# Patient Record
Sex: MALE | Race: WHITE | Employment: OTHER | URBAN - METROPOLITAN AREA
[De-identification: names, ages, dates, MRNs, and addresses within clinical notes are randomized per-mention and may not be internally consistent; named-entity substitution may affect disease eponyms.]

---

## 2022-08-13 ENCOUNTER — ANESTHESIA (OUTPATIENT)
Dept: OPERATING ROOM | Age: 67
End: 2022-08-13
Payer: COMMERCIAL

## 2022-08-13 ENCOUNTER — ANESTHESIA EVENT (OUTPATIENT)
Dept: OPERATING ROOM | Age: 67
End: 2022-08-13
Payer: COMMERCIAL

## 2022-08-13 ENCOUNTER — HOSPITAL ENCOUNTER (OUTPATIENT)
Age: 67
Setting detail: OUTPATIENT SURGERY
Discharge: HOME | End: 2022-08-13
Attending: SPECIALIST | Admitting: SPECIALIST
Payer: COMMERCIAL

## 2022-08-13 VITALS
HEART RATE: 54 BPM | BODY MASS INDEX: 40.09 KG/M2 | TEMPERATURE: 97.9 F | DIASTOLIC BLOOD PRESSURE: 67 MMHG | HEIGHT: 70 IN | RESPIRATION RATE: 22 BRPM | OXYGEN SATURATION: 94 % | SYSTOLIC BLOOD PRESSURE: 122 MMHG | WEIGHT: 280 LBS

## 2022-08-13 DIAGNOSIS — Z12.11 COLON CANCER SCREENING: ICD-10-CM

## 2022-08-13 LAB
GLUCOSE BLD-MCNC: 103 MG/DL (ref 70–99)
PERFORMED ON: ABNORMAL

## 2022-08-13 PROCEDURE — 3609027000 HC COLONOSCOPY: Performed by: SPECIALIST

## 2022-08-13 PROCEDURE — 3700000000 HC ANESTHESIA ATTENDED CARE: Performed by: SPECIALIST

## 2022-08-13 PROCEDURE — 2580000003 HC RX 258: Performed by: SPECIALIST

## 2022-08-13 PROCEDURE — 7100000011 HC PHASE II RECOVERY - ADDTL 15 MIN: Performed by: SPECIALIST

## 2022-08-13 PROCEDURE — 7100000010 HC PHASE II RECOVERY - FIRST 15 MIN: Performed by: SPECIALIST

## 2022-08-13 PROCEDURE — 45380 COLONOSCOPY AND BIOPSY: CPT | Performed by: SPECIALIST

## 2022-08-13 PROCEDURE — 3700000001 HC ADD 15 MINUTES (ANESTHESIA): Performed by: SPECIALIST

## 2022-08-13 PROCEDURE — 6360000002 HC RX W HCPCS: Performed by: NURSE ANESTHETIST, CERTIFIED REGISTERED

## 2022-08-13 PROCEDURE — 2500000003 HC RX 250 WO HCPCS: Performed by: NURSE ANESTHETIST, CERTIFIED REGISTERED

## 2022-08-13 PROCEDURE — 2709999900 HC NON-CHARGEABLE SUPPLY: Performed by: SPECIALIST

## 2022-08-13 PROCEDURE — 88305 TISSUE EXAM BY PATHOLOGIST: CPT

## 2022-08-13 RX ORDER — GLIMEPIRIDE 1 MG/1
1 TABLET ORAL
COMMUNITY

## 2022-08-13 RX ORDER — LIDOCAINE HYDROCHLORIDE 20 MG/ML
INJECTION, SOLUTION INFILTRATION; PERINEURAL PRN
Status: DISCONTINUED | OUTPATIENT
Start: 2022-08-13 | End: 2022-08-13 | Stop reason: SDUPTHER

## 2022-08-13 RX ORDER — AMLODIPINE BESYLATE 5 MG/1
5 TABLET ORAL DAILY
COMMUNITY

## 2022-08-13 RX ORDER — MAGNESIUM HYDROXIDE 1200 MG/15ML
LIQUID ORAL PRN
Status: DISCONTINUED | OUTPATIENT
Start: 2022-08-13 | End: 2022-08-13 | Stop reason: ALTCHOICE

## 2022-08-13 RX ORDER — SODIUM CHLORIDE, SODIUM LACTATE, POTASSIUM CHLORIDE, CALCIUM CHLORIDE 600; 310; 30; 20 MG/100ML; MG/100ML; MG/100ML; MG/100ML
500 INJECTION, SOLUTION INTRAVENOUS CONTINUOUS
Status: DISCONTINUED | OUTPATIENT
Start: 2022-08-13 | End: 2022-08-13 | Stop reason: HOSPADM

## 2022-08-13 RX ORDER — FINASTERIDE 5 MG/1
5 TABLET, FILM COATED ORAL DAILY
COMMUNITY

## 2022-08-13 RX ORDER — PROPOFOL 10 MG/ML
INJECTION, EMULSION INTRAVENOUS PRN
Status: DISCONTINUED | OUTPATIENT
Start: 2022-08-13 | End: 2022-08-13 | Stop reason: SDUPTHER

## 2022-08-13 RX ORDER — ROSUVASTATIN CALCIUM 5 MG/1
5 TABLET, COATED ORAL DAILY
COMMUNITY

## 2022-08-13 RX ADMIN — PROPOFOL 300 MG: 10 INJECTION, EMULSION INTRAVENOUS at 08:41

## 2022-08-13 RX ADMIN — SODIUM CHLORIDE, POTASSIUM CHLORIDE, SODIUM LACTATE AND CALCIUM CHLORIDE 500 ML: 600; 310; 30; 20 INJECTION, SOLUTION INTRAVENOUS at 07:58

## 2022-08-13 RX ADMIN — LIDOCAINE HYDROCHLORIDE 20 MG: 20 INJECTION, SOLUTION INFILTRATION; PERINEURAL at 08:41

## 2022-08-13 ASSESSMENT — PAIN SCALES - GENERAL
PAINLEVEL_OUTOF10: 0

## 2022-08-13 ASSESSMENT — PAIN - FUNCTIONAL ASSESSMENT: PAIN_FUNCTIONAL_ASSESSMENT: 0-10

## 2022-08-13 NOTE — PROGRESS NOTES
Patient ID:  Marko Palacios  [id number]  75 y.o.  1955    9:07 AM Patient received in  PACU 2 Report received from Anesthesia and Surgical Nurse. Attached to Monitor, VSS, See Nursing Assessment and Flowsheets for detailed Information    9:28 AM Awake, following commands, answering questions appropriately. O2 Sats>92 on Room air. Taking po fluids well, VSS.V Discontinued per protocol, catheter intact, patient tolerated well. Passing flatus, denies pain. 9:36 AM Discharge Instructions given, patient verbalizes an understanding. VSS.  Discharged to home per w/c in apparent Stable Condition    Electronically signed by Michelle rGove RN on 8/13/22

## 2022-08-13 NOTE — ANESTHESIA PRE PROCEDURE
Department of Anesthesiology  Preprocedure Note       Name:  Amena Sams   Age:  75 y.o.  :  1955                                          MRN:  [id number]         Date:  2022      Surgeon: Dionne Draper):  Valarie Lugo MD    Procedure: Procedure(s):  COLORECTAL CANCER SCREENING, NOT HIGH RISK    Medications prior to admission:   Prior to Admission medications    Medication Sig Start Date End Date Taking? Authorizing Provider   amLODIPine (NORVASC) 5 MG tablet Take 5 mg by mouth in the morning. Yes Historical Provider, MD   finasteride (PROSCAR) 5 MG tablet Take 5 mg by mouth in the morning. Yes Historical Provider, MD   glimepiride (AMARYL) 1 MG tablet Take 1 mg by mouth every morning (before breakfast)   Yes Historical Provider, MD   rosuvastatin (CRESTOR) 5 MG tablet Take 5 mg by mouth in the morning. Yes Historical Provider, MD   traMADol (ULTRAM) 50 MG tablet Take 1 tablet by mouth 2 times daily as needed for Pain for up to 30 days. Fill 20. 20  ESPERANZA Pham CNP   lidocaine (XYLOCAINE) 5 % ointment Apply 1 gram topically QID as needed to affected area. Patient not taking: Reported on 2022   ESPERANZA Pham CNP   gabapentin (NEURONTIN) 300 MG capsule Take 1 capsule by mouth 2 times daily for 30 days. Intended supply: 30 days 3/12/20 4/11/20  Maria Antonia Ayala,    gabapentin (NEURONTIN) 600 MG tablet TAKE 1 TABLET BY MOUTH THREE TIMES DAILY 16   Maria Antonia Ayala, DO   gabapentin (NEURONTIN) 600 MG tablet Take 1 tablet by mouth 3 times daily 16  Maria Antonia Ayala, DO   naproxen (NAPROSYN) 500 MG tablet TAKE 1 TABLET BY MOUTH 2 TIMES DAILY (WITH MEALS) 16   Maria Antonia Ayala, DO   gabapentin (NEURONTIN) 300 MG capsule Take 1 capsule by mouth 3 times daily 9/14/15   Maria Antonia Ayala, DO   methylPREDNISolone (MEDROL, THALIA,) 4 MG tablet Take by mouth. As directed, 5 days.   Patient not taking: Reported on 2022 8/31/15   Maria Antonia Ayala, DO oxyCODONE-acetaminophen (PERCOCET)  MG per tablet Take 1 tablet by mouth 4 times daily  Patient not taking: Reported on 8/13/2022    Historical Provider, MD   tamsulosin (FLOMAX) 0.4 MG capsule Take 0.4 mg by mouth daily. Historical Provider, MD   lisinopril (PRINIVIL;ZESTRIL) 40 MG tablet Take 40 mg by mouth daily. Historical Provider, MD   busPIRone (BUSPAR) 10 MG tablet Take 10 mg by mouth daily. Patient not taking: Reported on 8/13/2022    Historical Provider, MD   buPROPion 150 MG SR tablet Take 150 mg by mouth Daily. Patient not taking: Reported on 8/13/2022    Historical Provider, MD   ARIPiprazole (ABILIFY) 2 MG tablet Take 2 mg by mouth daily. Patient not taking: Reported on 8/13/2022    Historical Provider, MD   Fexofenadine-Pseudoephedrine (ALLEGRA-D 24 HOUR PO) Take  by mouth Daily. Historical Provider, MD   Multiple Vitamin (MULTIVITAMIN) tablet Take 1 tablet by mouth daily. Historical Provider, MD   fish oil-omega-3 fatty acids 1000 MG capsule Take 1 g by mouth 2 times daily. Historical Provider, MD   albuterol (PROVENTIL HFA;VENTOLIN HFA) 108 (90 BASE) MCG/ACT inhaler Inhale 2 puffs into the lungs 4 times daily. Historical Provider, MD   Cholecalciferol (VITAMIN D3) 1000 UNITS TABS Take  by mouth Daily. Historical Provider, MD   zonisamide (ZONEGRAN) 50 MG capsule Take 50 mg by mouth daily. Patient not taking: Reported on 8/13/2022    Historical Provider, MD       Current medications:    Current Facility-Administered Medications   Medication Dose Route Frequency Provider Last Rate Last Admin    lactated ringers infusion 500 mL  500 mL IntraVENous Continuous Magdy Gtz  mL/hr at 08/13/22 0758 500 mL at 08/13/22 0758       Allergies:     Allergies   Allergen Reactions    Finasteride        Problem List:    Patient Active Problem List   Diagnosis Code    HTN (hypertension) I10    Hyperlipidemia E78.5    Vitamin D deficiency E55.9    [facility]) [facility] J44.9    Depression F32. A    KASI (obstructive sleep apnea) G47.33    History of TIAs Z86.73    Arthritis M19.90    Degeneration of lumbar or lumbosacral intervertebral disc M51.37    Lumbar stenosis with neurogenic claudication M48.062       Past Medical History:        Diagnosis Date    Arthritis     Bronchitis     Chickenpox     COPD (chronic obstructive pulmonary disease) (HCC)     per pt not a medical history    Depression     History of TIAs     HTN (hypertension)     Hyperlipidemia     Measles     Mumps     KASI (obstructive sleep apnea)     Osteoarthritis     Vitamin D deficiency        Past Surgical History:        Procedure Laterality Date    JOINT REPLACEMENT Right 10/01/2015    knee    TOTAL KNEE ARTHROPLASTY Left     PARTIAL       Social History:    Social History     Tobacco Use    Smoking status: Never    Smokeless tobacco: Current   Substance Use Topics    Alcohol use: Yes                                Ready to quit: Not Answered  Counseling given: Not Answered      Vital Signs (Current):   Vitals:    08/13/22 0743   BP: (!) 153/79   Pulse: 63   Resp: 18   Temp: 36.7 °C (98 °F)   TempSrc: Temporal   SpO2: 98%   Weight: 280 lb (127 kg)   Height: 5' 9.5\" (1.765 m)                                              BP Readings from Last 3 Encounters:   08/13/22 (!) 153/79   05/03/16 (!) 153/97   04/01/16 139/79       NPO Status: Time of last liquid consumption: 2200                        Time of last solid consumption: 2300                        Date of last liquid consumption: 08/12/22                        Date of last solid food consumption: 08/11/22    BMI:   Wt Readings from Last 3 Encounters:   08/13/22 280 lb (127 kg)   03/12/20 275 lb (124.7 kg)   06/07/16 275 lb (124.7 kg)     Body mass index is 40.76 kg/m².     CBC: No results found for: WBC, RBC, HGB, HCT, MCV, RDW, PLT    CMP: No results found for: NA, K, CL, CO2, BUN, CREATININE, GFRAA, AGRATIO, LABGLOM, GLUCOSE, GLU, PROT, CALCIUM, BILITOT, ALKPHOS, AST, ALT    POC Tests:   Recent Labs     08/13/22  0748   POCGLU 103*       Coags: No results found for: PROTIME, INR, APTT    HCG (If Applicable): No results found for: PREGTESTUR, PREGSERUM, HCG, HCGQUANT     ABGs: No results found for: PHART, PO2ART, RHV4OIH, RME7BQD, BEART, D7XSAATA     Type & Screen (If Applicable):  No results found for: LABABO, LABRH    Drug/Infectious Status (If Applicable):  No results found for: HIV, HEPCAB    COVID-19 Screening (If Applicable): No results found for: COVID19        Anesthesia Evaluation  Patient summary reviewed and Nursing notes reviewed  Airway: Mallampati: III  TM distance: >3 FB   Neck ROM: full  Mouth opening: > = 3 FB   Dental:    (+) other      Pulmonary: breath sounds clear to auscultation  (+) COPD:  sleep apnea:                             Cardiovascular:  Exercise tolerance: no interval change,   (+) hypertension:, hyperlipidemia      NYHA Classification: III    Rhythm: regular  Rate: normal           Beta Blocker:  Not on Beta Blocker         Neuro/Psych:   (+) TIA,             GI/Hepatic/Renal:   (+) bowel prep, morbid obesity          Endo/Other: Negative Endo/Other ROS                    Abdominal:   (+) obese,     Abdomen: soft. Vascular: negative vascular ROS. Other Findings:           Anesthesia Plan      MAC     ASA 3       Induction: intravenous. continuous noninvasive hemodynamic monitor    Anesthetic plan and risks discussed with patient. Plan discussed with attending.                     ESPERANZA Tineo - SAMEERA   8/13/2022

## 2022-08-13 NOTE — H&P
DAILY (WITH MEALS) 4/1/16   Rocky Isaacs DO   gabapentin (NEURONTIN) 300 MG capsule Take 1 capsule by mouth 3 times daily 9/14/15   Rocky Isaacs DO   methylPREDNISolone (MEDROL, THALIA,) 4 MG tablet Take by mouth. As directed, 5 days. 8/31/15   Rocky Isaacs DO   oxyCODONE-acetaminophen (PERCOCET)  MG per tablet Take 1 tablet by mouth 4 times daily    Historical Provider, MD   tamsulosin (FLOMAX) 0.4 MG capsule Take 0.4 mg by mouth daily. Historical Provider, MD   lisinopril (PRINIVIL;ZESTRIL) 40 MG tablet Take 40 mg by mouth daily. Historical Provider, MD   busPIRone (BUSPAR) 10 MG tablet Take 10 mg by mouth daily. Historical Provider, MD   buPROPion 150 MG SR tablet Take 150 mg by mouth Daily. Historical Provider, MD   ARIPiprazole (ABILIFY) 2 MG tablet Take 2 mg by mouth daily. Historical Provider, MD   Fexofenadine-Pseudoephedrine (ALLEGRA-D 24 HOUR PO) Take  by mouth Daily. Historical Provider, MD   Multiple Vitamin (MULTIVITAMIN) tablet Take 1 tablet by mouth daily. Historical Provider, MD   fish oil-omega-3 fatty acids 1000 MG capsule Take 1 g by mouth 2 times daily. Historical Provider, MD   albuterol (PROVENTIL HFA;VENTOLIN HFA) 108 (90 BASE) MCG/ACT inhaler Inhale 2 puffs into the lungs 4 times daily. Historical Provider, MD   Cholecalciferol (VITAMIN D3) 1000 UNITS TABS Take  by mouth Daily. Historical Provider, MD   zonisamide (ZONEGRAN) 50 MG capsule Take 50 mg by mouth daily. Historical Provider, MD       Allergies:    Allergies   Allergen Reactions    Finasteride         History of allergic reaction to anesthesia:  No    Past Medical History:  Past Medical History:   Diagnosis Date    Arthritis     Bronchitis     Chickenpox     COPD (chronic obstructive pulmonary disease) (HCC)     Depression     History of TIAs     HTN (hypertension)     Hyperlipidemia     Measles     Mumps     KASI (obstructive sleep apnea)     Osteoarthritis     Vitamin D deficiency        Past Surgical History:  Past Surgical History:   Procedure Laterality Date    JOINT REPLACEMENT Right 10/2015    knee    TOTAL KNEE ARTHROPLASTY Right     PARTIAL       Social History:  Social History     Tobacco Use    Smoking status: Never    Smokeless tobacco: Current   Substance Use Topics    Alcohol use: Yes    Drug use: No       Vital Signs: There were no vitals filed for this visit. Physical Exam:  Cardiac:  [x]WNL  []Comments:  Pulmonary:  [x]WNL   []Comments:   Neuro/Mental Status:  [x]WNL  []Comments:  Abdominal:  [x]WNL    []Comments:  Other:   []WNL  []Comments:    Informed Consent:  The risks and benefits of the procedure have been discussed with either the patient or if they cannot consent, their representative. Assessment:  Patient examined and appropriate for planned sedation and procedure. Plan:  Proceed with planned sedation and procedure as above.     Star Humphrey MD  7:42 AM

## 2022-08-13 NOTE — ANESTHESIA POSTPROCEDURE EVALUATION
Department of Anesthesiology  Postprocedure Note    Patient: William Meyer  MRN: [id number]  YOB: 1955  Date of evaluation: 8/13/2022      Procedure Summary     Date: 08/13/22 Room / Location: [address] [place] [address]    Anesthesia Start: 2939 Anesthesia Stop:     Procedure: COLORECTAL CANCER SCREENING, NOT HIGH RISK Diagnosis:       Colon cancer screening      (Screening)    Surgeons: Stalin Rivera MD Responsible Provider: ESPERANZA Grullon CRNA    Anesthesia Type: MAC ASA Status: 3          Anesthesia Type: No value filed.     Macy Phase I: Macy Score: 10    Macy Phase II:        Anesthesia Post Evaluation    Patient location during evaluation: PACU  Patient participation: waiting for patient participation  Level of consciousness: sleepy but conscious  Pain score: 1  Airway patency: patent  Nausea & Vomiting: no nausea and no vomiting  Complications: no  Cardiovascular status: hemodynamically stable  Respiratory status: acceptable and nasal cannula  Hydration status: euvolemic  Comments: Report to RN, normal sinus rhythm

## 2022-08-13 NOTE — DISCHARGE INSTRUCTIONS
Lower Discharge Instructions    Patient Name: Edilma Petit  Patient ID: [id number]  YOB: 1955  Procedure: Hank Chisholm  Referring Physician: [unfilled]  Procedure Date: 8/13/2022    Recommendations:  []   Follow-up appointment with family physician in 4 weeks. [x]   Colonoscopy recommended in 5 years. []   Follow-up appointment with endoscopist in  Reports of your procedure and these recommendations have been sent to:  [unfilled]    Sedative medication given for procedures can slow your reaction time and coordination for many hours. If you receive medications, it is important for your safety to follow the instructions below for the remainder of the day:  BE TAKEN directly home from the center and rest quietly. DO NOT resume normal activities until tomorrow. Do NOT drive, return to work, or operate any machinery or power tools. Do NOT make any important personal or business decisions, sign any legal papers or perform any activity that depends on your full concentration power or mental judgement. Do NOT drink any alcohol or take nerve or sleeping drugs. They add to the effects of the medicine still present in your body.    [] (Checked if a biopsy or polyp removal was performed)  There is a slight risk of bleeding. If you had a biopsy or a polyp removed, we suggest that you follow the instructions below:  Do NOT take aspirin or similar anti-inflammatory medicine for ___ days. Do NOT exercise, jog, or do any heavy lifting or straining for 1 day. Potential common after effects and treatment following the procedure:  Mild abdominal pain, bloating, or excessive gas - rest, eat lightly, and use a heating pad. Redness and/or swelling where the IV was - apply heat and elevate. Symptoms to report to your physician:  Severe abdominal pain or bloating. Chills or fever above 101 degrees occurring within 24 hours after procedure. Large amounts of rectal bleeding that does not stop.  Small amount of rectal bleeding is not serious especially if hemorrhoids are present. Unable to keep down food or drink. IV site stays red and swollen for more than 2 days. In the case of an emergency, please go to the emergency room. If you are not having an emergency but are having some of the above symptoms please call the doctor's office at:  Dr. Nadya Conde [phone]   [email or web address]      I have read and understand the above instructions:            ____________________________         ____________________________  Patient or Patient Rep. Signature   Witness Signature      Date: 8/13/2022  Time:

## 2022-09-12 PROBLEM — Z12.11 COLON CANCER SCREENING: Status: RESOLVED | Noted: 2022-08-13 | Resolved: 2022-09-12

## 2023-05-05 LAB
ACTIVATED PARTIAL THROMBOPLASTIN TIME IN PPP BY COAGULATION ASSAY: 30 SEC (ref 26–39)
ALANINE AMINOTRANSFERASE (SGPT) (U/L) IN SER/PLAS: 37 U/L (ref 10–52)
ALBUMIN (G/DL) IN SER/PLAS: 4.5 G/DL (ref 3.4–5)
ALKALINE PHOSPHATASE (U/L) IN SER/PLAS: 49 U/L (ref 33–136)
ANION GAP IN SER/PLAS: 13 MMOL/L (ref 10–20)
ASPARTATE AMINOTRANSFERASE (SGOT) (U/L) IN SER/PLAS: 28 U/L (ref 9–39)
BASOPHILS (10*3/UL) IN BLOOD BY AUTOMATED COUNT: 0.03 X10E9/L (ref 0–0.1)
BASOPHILS/100 LEUKOCYTES IN BLOOD BY AUTOMATED COUNT: 0.5 % (ref 0–2)
BILIRUBIN TOTAL (MG/DL) IN SER/PLAS: 0.6 MG/DL (ref 0–1.2)
CALCIUM (MG/DL) IN SER/PLAS: 9.6 MG/DL (ref 8.6–10.3)
CARBON DIOXIDE, TOTAL (MMOL/L) IN SER/PLAS: 28 MMOL/L (ref 21–32)
CHLORIDE (MMOL/L) IN SER/PLAS: 101 MMOL/L (ref 98–107)
CHOLESTEROL (MG/DL) IN SER/PLAS: 147 MG/DL (ref 0–199)
CHOLESTEROL IN HDL (MG/DL) IN SER/PLAS: 34 MG/DL
CHOLESTEROL/HDL RATIO: 4.3
CREATININE (MG/DL) IN SER/PLAS: 0.86 MG/DL (ref 0.5–1.3)
EOSINOPHILS (10*3/UL) IN BLOOD BY AUTOMATED COUNT: 0.29 X10E9/L (ref 0–0.7)
EOSINOPHILS/100 LEUKOCYTES IN BLOOD BY AUTOMATED COUNT: 4.6 % (ref 0–6)
ERYTHROCYTE DISTRIBUTION WIDTH (RATIO) BY AUTOMATED COUNT: 14 % (ref 11.5–14.5)
ERYTHROCYTE MEAN CORPUSCULAR HEMOGLOBIN CONCENTRATION (G/DL) BY AUTOMATED: 33.3 G/DL (ref 32–36)
ERYTHROCYTE MEAN CORPUSCULAR VOLUME (FL) BY AUTOMATED COUNT: 93 FL (ref 80–100)
ERYTHROCYTES (10*6/UL) IN BLOOD BY AUTOMATED COUNT: 5.02 X10E12/L (ref 4.5–5.9)
GFR MALE: >90 ML/MIN/1.73M2
GLUCOSE (MG/DL) IN SER/PLAS: 116 MG/DL (ref 74–99)
HEMATOCRIT (%) IN BLOOD BY AUTOMATED COUNT: 46.5 % (ref 41–52)
HEMOGLOBIN (G/DL) IN BLOOD: 15.5 G/DL (ref 13.5–17.5)
IMMATURE GRANULOCYTES/100 LEUKOCYTES IN BLOOD BY AUTOMATED COUNT: 0.2 % (ref 0–0.9)
INR IN PPP BY COAGULATION ASSAY: 1 (ref 0.9–1.1)
LDL: 76 MG/DL (ref 0–99)
LEUKOCYTES (10*3/UL) IN BLOOD BY AUTOMATED COUNT: 6.4 X10E9/L (ref 4.4–11.3)
LYMPHOCYTES (10*3/UL) IN BLOOD BY AUTOMATED COUNT: 2.73 X10E9/L (ref 1.2–4.8)
LYMPHOCYTES/100 LEUKOCYTES IN BLOOD BY AUTOMATED COUNT: 42.9 % (ref 13–44)
MONOCYTES (10*3/UL) IN BLOOD BY AUTOMATED COUNT: 0.79 X10E9/L (ref 0.1–1)
MONOCYTES/100 LEUKOCYTES IN BLOOD BY AUTOMATED COUNT: 12.4 % (ref 2–10)
NEUTROPHILS (10*3/UL) IN BLOOD BY AUTOMATED COUNT: 2.52 X10E9/L (ref 1.2–7.7)
NEUTROPHILS/100 LEUKOCYTES IN BLOOD BY AUTOMATED COUNT: 39.4 % (ref 40–80)
PLATELETS (10*3/UL) IN BLOOD AUTOMATED COUNT: 237 X10E9/L (ref 150–450)
POTASSIUM (MMOL/L) IN SER/PLAS: 3.9 MMOL/L (ref 3.5–5.3)
PROTEIN TOTAL: 7.6 G/DL (ref 6.4–8.2)
PROTHROMBIN TIME (PT) IN PPP BY COAGULATION ASSAY: 11.2 SEC (ref 9.8–13.4)
SODIUM (MMOL/L) IN SER/PLAS: 138 MMOL/L (ref 136–145)
THYROTROPIN (MIU/L) IN SER/PLAS BY DETECTION LIMIT <= 0.05 MIU/L: 4.1 MIU/L (ref 0.44–3.98)
TRIGLYCERIDE (MG/DL) IN SER/PLAS: 184 MG/DL (ref 0–149)
UREA NITROGEN (MG/DL) IN SER/PLAS: 25 MG/DL (ref 6–23)
VLDL: 37 MG/DL (ref 0–40)

## 2023-05-09 ENCOUNTER — HOSPITAL ENCOUNTER (OUTPATIENT)
Dept: DATA CONVERSION | Facility: HOSPITAL | Age: 68
End: 2023-05-09
Attending: PLASTIC SURGERY | Admitting: PLASTIC SURGERY
Payer: COMMERCIAL

## 2023-05-09 DIAGNOSIS — L72.0 EPIDERMAL CYST: ICD-10-CM

## 2023-05-09 DIAGNOSIS — F32.A DEPRESSION, UNSPECIFIED: ICD-10-CM

## 2023-05-09 DIAGNOSIS — E66.9 OBESITY, UNSPECIFIED: ICD-10-CM

## 2023-05-09 DIAGNOSIS — G47.33 OBSTRUCTIVE SLEEP APNEA (ADULT) (PEDIATRIC): ICD-10-CM

## 2023-05-09 DIAGNOSIS — R22.2 LOCALIZED SWELLING, MASS AND LUMP, TRUNK: ICD-10-CM

## 2023-05-09 DIAGNOSIS — I10 ESSENTIAL (PRIMARY) HYPERTENSION: ICD-10-CM

## 2023-05-09 DIAGNOSIS — N40.0 BENIGN PROSTATIC HYPERPLASIA WITHOUT LOWER URINARY TRACT SYMPTOMS: ICD-10-CM

## 2023-05-09 DIAGNOSIS — Z86.73 PERSONAL HISTORY OF TRANSIENT ISCHEMIC ATTACK (TIA), AND CEREBRAL INFARCTION WITHOUT RESIDUAL DEFICITS: ICD-10-CM

## 2023-05-09 DIAGNOSIS — J44.9 CHRONIC OBSTRUCTIVE PULMONARY DISEASE, UNSPECIFIED (MULTI): ICD-10-CM

## 2023-05-09 DIAGNOSIS — E78.5 HYPERLIPIDEMIA, UNSPECIFIED: ICD-10-CM

## 2023-05-09 LAB — POCT GLUCOSE: 101 MG/DL (ref 74–99)

## 2023-05-30 LAB
COMPLETE PATHOLOGY REPORT: NORMAL
CONVERTED CLINICAL DIAGNOSIS-HISTORY: NORMAL
CONVERTED FINAL DIAGNOSIS: NORMAL
CONVERTED FINAL REPORT PDF LINK TO COPY AND PASTE: NORMAL
CONVERTED GROSS DESCRIPTION: NORMAL

## 2023-09-14 NOTE — H&P
History & Physical Reviewed:   I have reviewed the History and Physical dated:  09-May-2023   History and Physical reviewed and relevant findings noted. Patient examined to review pertinent physical  findings.: No significant changes   Home Medications Reviewed: no changes noted   Allergies Reviewed: no changes noted       ERAS (Enhanced Recovery After Surgery):  ·  ERAS Patient: no     Consent:   COVID-19 Consent:  ·  COVID-19 Risk Consent Surgeon has reviewed key risks related to the risk of chrissy COVID-19 and if they contract COVID-19 what the risks are.       Electronic Signatures:  Reyes, Roland (MD)  (Signed 09-May-2023 09:30)   Authored: History & Physical Reviewed, ERAS, Consent,  Note Completion      Last Updated: 09-May-2023 09:30 by Reyes, Roland (MD)

## 2023-10-02 NOTE — OP NOTE
Post Operative Note:     PreOp Diagnosis: Mass of back   Post-Procedure Diagnosis: Mass of back   Procedure: 1.  Excision mass of back  2.   3.   4.   5.   Surgeon: Roland Reyes MD   Resident/Fellow/Other Assistant: Bipin Judd   Estimated Blood Loss (mL): Minimal   Specimen: yes. Mass of back   Findings: Mass of back       Electronic Signatures:  Reyes, Roland (MD)  (Signed 09-May-2023 10:53)   Authored: Post Operative Note, Note Completion      Last Updated: 09-May-2023 10:53 by Reyes, Roland (MD)

## 2023-10-03 ENCOUNTER — LAB (OUTPATIENT)
Dept: LAB | Facility: LAB | Age: 68
End: 2023-10-03
Payer: COMMERCIAL

## 2023-10-03 DIAGNOSIS — E03.8 OTHER SPECIFIED HYPOTHYROIDISM: ICD-10-CM

## 2023-10-03 DIAGNOSIS — I10 ESSENTIAL (PRIMARY) HYPERTENSION: ICD-10-CM

## 2023-10-03 DIAGNOSIS — Z12.5 ENCOUNTER FOR SCREENING FOR MALIGNANT NEOPLASM OF PROSTATE: ICD-10-CM

## 2023-10-03 DIAGNOSIS — E78.49 OTHER HYPERLIPIDEMIA: ICD-10-CM

## 2023-10-03 DIAGNOSIS — E03.8 OTHER SPECIFIED HYPOTHYROIDISM: Primary | ICD-10-CM

## 2023-10-03 LAB
ALBUMIN SERPL BCP-MCNC: 4.2 G/DL (ref 3.4–5)
ALP SERPL-CCNC: 48 U/L (ref 33–136)
ALT SERPL W P-5'-P-CCNC: 34 U/L (ref 10–52)
ANION GAP SERPL CALC-SCNC: 11 MMOL/L (ref 10–20)
AST SERPL W P-5'-P-CCNC: 24 U/L (ref 9–39)
BASOPHILS # BLD AUTO: 0.02 X10*3/UL (ref 0–0.1)
BASOPHILS NFR BLD AUTO: 0.4 %
BILIRUB SERPL-MCNC: 0.5 MG/DL (ref 0–1.2)
BUN SERPL-MCNC: 23 MG/DL (ref 6–23)
CALCIUM SERPL-MCNC: 9.2 MG/DL (ref 8.6–10.3)
CHLORIDE SERPL-SCNC: 101 MMOL/L (ref 98–107)
CHOLEST SERPL-MCNC: 139 MG/DL (ref 0–199)
CHOLESTEROL/HDL RATIO: 3.9
CO2 SERPL-SCNC: 29 MMOL/L (ref 21–32)
CREAT SERPL-MCNC: 0.85 MG/DL (ref 0.5–1.3)
EOSINOPHIL # BLD AUTO: 0.32 X10*3/UL (ref 0–0.7)
EOSINOPHIL NFR BLD AUTO: 6.2 %
ERYTHROCYTE [DISTWIDTH] IN BLOOD BY AUTOMATED COUNT: 13.7 % (ref 11.5–14.5)
GFR SERPL CREATININE-BSD FRML MDRD: >90 ML/MIN/1.73M*2
GLUCOSE SERPL-MCNC: 116 MG/DL (ref 74–99)
HCT VFR BLD AUTO: 44.4 % (ref 41–52)
HDLC SERPL-MCNC: 35.5 MG/DL
HGB BLD-MCNC: 14.5 G/DL (ref 13.5–17.5)
IMM GRANULOCYTES # BLD AUTO: 0 X10*3/UL (ref 0–0.7)
IMM GRANULOCYTES NFR BLD AUTO: 0 % (ref 0–0.9)
LDLC SERPL CALC-MCNC: 71 MG/DL (ref 140–190)
LYMPHOCYTES # BLD AUTO: 2.3 X10*3/UL (ref 1.2–4.8)
LYMPHOCYTES NFR BLD AUTO: 44.4 %
MCH RBC QN AUTO: 30.1 PG (ref 26–34)
MCHC RBC AUTO-ENTMCNC: 32.7 G/DL (ref 32–36)
MCV RBC AUTO: 92 FL (ref 80–100)
MONOCYTES # BLD AUTO: 0.63 X10*3/UL (ref 0.1–1)
MONOCYTES NFR BLD AUTO: 12.2 %
NEUTROPHILS # BLD AUTO: 1.91 X10*3/UL (ref 1.2–7.7)
NEUTROPHILS NFR BLD AUTO: 36.8 %
NON HDL CHOLESTEROL: 104 MG/DL (ref 0–149)
NRBC BLD-RTO: 0 /100 WBCS (ref 0–0)
PLATELET # BLD AUTO: 230 X10*3/UL (ref 150–450)
PMV BLD AUTO: 10.3 FL (ref 7.5–11.5)
POTASSIUM SERPL-SCNC: 4.2 MMOL/L (ref 3.5–5.3)
PROT SERPL-MCNC: 7.1 G/DL (ref 6.4–8.2)
PSA SERPL-MCNC: 0.13 NG/ML
RBC # BLD AUTO: 4.82 X10*6/UL (ref 4.5–5.9)
SODIUM SERPL-SCNC: 137 MMOL/L (ref 136–145)
TRIGL SERPL-MCNC: 161 MG/DL (ref 0–149)
TSH SERPL-ACNC: 4.3 MIU/L (ref 0.44–3.98)
VLDL: 32 MG/DL (ref 0–40)
WBC # BLD AUTO: 5.2 X10*3/UL (ref 4.4–11.3)

## 2023-10-03 PROCEDURE — 36415 COLL VENOUS BLD VENIPUNCTURE: CPT

## 2024-01-29 ENCOUNTER — HOSPITAL ENCOUNTER (OUTPATIENT)
Dept: RADIOLOGY | Facility: HOSPITAL | Age: 69
Discharge: HOME | End: 2024-01-29
Payer: MEDICARE

## 2024-01-29 DIAGNOSIS — N40.1 BENIGN PROSTATIC HYPERPLASIA WITH LOWER URINARY TRACT SYMPTOMS: ICD-10-CM

## 2024-01-29 DIAGNOSIS — R10.2 PELVIC AND PERINEAL PAIN: ICD-10-CM

## 2024-01-29 DIAGNOSIS — N31.8 OTHER NEUROMUSCULAR DYSFUNCTION OF BLADDER: ICD-10-CM

## 2024-01-29 DIAGNOSIS — R39.11 HESITANCY OF MICTURITION: ICD-10-CM

## 2024-01-29 DIAGNOSIS — N13.8 OTHER OBSTRUCTIVE AND REFLUX UROPATHY: ICD-10-CM

## 2024-01-29 PROCEDURE — 76770 US EXAM ABDO BACK WALL COMP: CPT | Performed by: RADIOLOGY

## 2024-01-29 PROCEDURE — 76770 US EXAM ABDO BACK WALL COMP: CPT

## 2024-02-01 ENCOUNTER — HOSPITAL ENCOUNTER (OUTPATIENT)
Dept: RADIOLOGY | Facility: HOSPITAL | Age: 69
Discharge: HOME | End: 2024-02-01
Payer: MEDICARE

## 2024-02-01 DIAGNOSIS — R39.11 HESITANCY OF MICTURITION: ICD-10-CM

## 2024-02-01 DIAGNOSIS — N40.1 BENIGN PROSTATIC HYPERPLASIA WITH LOWER URINARY TRACT SYMPTOMS: ICD-10-CM

## 2024-02-01 DIAGNOSIS — N31.8 OTHER NEUROMUSCULAR DYSFUNCTION OF BLADDER: ICD-10-CM

## 2024-02-01 DIAGNOSIS — N13.8 OTHER OBSTRUCTIVE AND REFLUX UROPATHY: ICD-10-CM

## 2024-02-01 DIAGNOSIS — R10.2 PELVIC AND PERINEAL PAIN: ICD-10-CM

## 2024-02-01 PROCEDURE — 76872 US TRANSRECTAL: CPT | Performed by: RADIOLOGY

## 2024-02-01 PROCEDURE — 76873 ECHOGRAP TRANS R PROS STUDY: CPT

## 2024-03-07 ENCOUNTER — LAB (OUTPATIENT)
Dept: LAB | Facility: LAB | Age: 69
End: 2024-03-07
Payer: MEDICARE

## 2024-03-07 DIAGNOSIS — E78.49 OTHER HYPERLIPIDEMIA: ICD-10-CM

## 2024-03-07 DIAGNOSIS — Z13.89 ENCOUNTER FOR SCREENING FOR OTHER DISORDER: ICD-10-CM

## 2024-03-07 DIAGNOSIS — E11.9 TYPE 2 DIABETES MELLITUS WITHOUT COMPLICATIONS (MULTI): ICD-10-CM

## 2024-03-07 DIAGNOSIS — E03.8 OTHER SPECIFIED HYPOTHYROIDISM: Primary | ICD-10-CM

## 2024-03-07 DIAGNOSIS — I10 ESSENTIAL (PRIMARY) HYPERTENSION: ICD-10-CM

## 2024-03-07 LAB
ALBUMIN SERPL BCP-MCNC: 4.1 G/DL (ref 3.4–5)
ALP SERPL-CCNC: 47 U/L (ref 33–136)
ALT SERPL W P-5'-P-CCNC: 31 U/L (ref 10–52)
ANION GAP SERPL CALC-SCNC: 12 MMOL/L (ref 10–20)
AST SERPL W P-5'-P-CCNC: 22 U/L (ref 9–39)
BASOPHILS # BLD AUTO: 0.02 X10*3/UL (ref 0–0.1)
BASOPHILS NFR BLD AUTO: 0.4 %
BILIRUB SERPL-MCNC: 0.5 MG/DL (ref 0–1.2)
BUN SERPL-MCNC: 24 MG/DL (ref 6–23)
CALCIUM SERPL-MCNC: 9.4 MG/DL (ref 8.6–10.3)
CHLORIDE SERPL-SCNC: 101 MMOL/L (ref 98–107)
CHOLEST SERPL-MCNC: 146 MG/DL (ref 0–199)
CHOLESTEROL/HDL RATIO: 3.8
CO2 SERPL-SCNC: 29 MMOL/L (ref 21–32)
CREAT SERPL-MCNC: 0.91 MG/DL (ref 0.5–1.3)
EGFRCR SERPLBLD CKD-EPI 2021: >90 ML/MIN/1.73M*2
EOSINOPHIL # BLD AUTO: 0.36 X10*3/UL (ref 0–0.7)
EOSINOPHIL NFR BLD AUTO: 6.5 %
ERYTHROCYTE [DISTWIDTH] IN BLOOD BY AUTOMATED COUNT: 13.7 % (ref 11.5–14.5)
EST. AVERAGE GLUCOSE BLD GHB EST-MCNC: 137 MG/DL
GLUCOSE SERPL-MCNC: 120 MG/DL (ref 74–99)
HBA1C MFR BLD: 6.4 %
HCT VFR BLD AUTO: 44.2 % (ref 41–52)
HDLC SERPL-MCNC: 38.3 MG/DL
HGB BLD-MCNC: 14.9 G/DL (ref 13.5–17.5)
IMM GRANULOCYTES # BLD AUTO: 0.01 X10*3/UL (ref 0–0.7)
IMM GRANULOCYTES NFR BLD AUTO: 0.2 % (ref 0–0.9)
LDLC SERPL CALC-MCNC: 77 MG/DL
LYMPHOCYTES # BLD AUTO: 2.3 X10*3/UL (ref 1.2–4.8)
LYMPHOCYTES NFR BLD AUTO: 41.5 %
MCH RBC QN AUTO: 30.7 PG (ref 26–34)
MCHC RBC AUTO-ENTMCNC: 33.7 G/DL (ref 32–36)
MCV RBC AUTO: 91 FL (ref 80–100)
MONOCYTES # BLD AUTO: 0.68 X10*3/UL (ref 0.1–1)
MONOCYTES NFR BLD AUTO: 12.3 %
NEUTROPHILS # BLD AUTO: 2.17 X10*3/UL (ref 1.2–7.7)
NEUTROPHILS NFR BLD AUTO: 39.1 %
NON HDL CHOLESTEROL: 108 MG/DL (ref 0–149)
NRBC BLD-RTO: 0 /100 WBCS (ref 0–0)
PLATELET # BLD AUTO: 259 X10*3/UL (ref 150–450)
POTASSIUM SERPL-SCNC: 4.2 MMOL/L (ref 3.5–5.3)
PROT SERPL-MCNC: 7 G/DL (ref 6.4–8.2)
RBC # BLD AUTO: 4.86 X10*6/UL (ref 4.5–5.9)
SODIUM SERPL-SCNC: 138 MMOL/L (ref 136–145)
TRIGL SERPL-MCNC: 156 MG/DL (ref 0–149)
TSH SERPL-ACNC: 3.26 MIU/L (ref 0.44–3.98)
VLDL: 31 MG/DL (ref 0–40)
WBC # BLD AUTO: 5.5 X10*3/UL (ref 4.4–11.3)

## 2024-03-07 PROCEDURE — 85025 COMPLETE CBC W/AUTO DIFF WBC: CPT

## 2024-03-07 PROCEDURE — 80053 COMPREHEN METABOLIC PANEL: CPT

## 2024-03-07 PROCEDURE — 84443 ASSAY THYROID STIM HORMONE: CPT

## 2024-03-07 PROCEDURE — 83036 HEMOGLOBIN GLYCOSYLATED A1C: CPT

## 2024-03-07 PROCEDURE — 36415 COLL VENOUS BLD VENIPUNCTURE: CPT

## 2024-03-07 PROCEDURE — 80061 LIPID PANEL: CPT

## 2024-05-06 NOTE — OP NOTE
SURGEON:  Roland James Reyes, MD    PREOPERATIVE DIAGNOSIS:    Mass of back.    POSTOPERATIVE DIAGNOSIS:    Mass of back.    PROCEDURE:    Excision mass of back.  .    FIRST ASSISTANT:    Evelia Judd.    ANESTHESIA:    MAC.    OPERATIVE PROCEDURE:    Patient was taken to the operating suite and placed in supine  position.  After successful sedation by the anesthesia personnel, the  patient was then placed in the right lateral decubitus position  making sure all pressure points were well padded.  Next, the back  mass was then prepped and draped in the appropriate sterile fashion.  The procedure was begun by first verifying the operative site  verbally with the operating staff and a lenticular shaped incision  was then made around the site of the mass.  This measures  approximately 4 x 5 cm.  The incision was then made with a #10  scalpel blade down to subcutaneous tissue after locally infiltrating  with 2% lidocaine with 1:100,000 epinephrine.  The mass was then  removed in total down to the fascia level.  Hemostasis was obtained  using electrocautery.  Specimen was then sent to Pathology.  Closure  was then done using interrupted 2-0 and 3-0 Vicryl subdermal sutures  followed by deeper sutures and then 2-0 Prolene sutures in simple  interrupted fashion.  Aquacel Silver and thin DuoDerm were then  applied.  The patient tolerated the procedure well, was sent to [facility]  in stable condition.  All instrument and sponge counts were correct.     ESTIMATED BLOOD LOSS:    Was minimal.      Roland James Reyes, MD    DD:  05/09/2023 11:10:14 EST  DT:  05/10/2023 05:51:44 EST  DICTATION NUMBER:  [id number]  INTERNAL JOB NUMBER:  [id number]      CC:ï¿½             [facility]          Electronic Signatures:  Reyes, Roland (MD) (Signed on 10-May-2023 07:55)   Authored  Unsigned, Draft (SYS GENERATED) (Entered on 10-May-2023 05:51)   Entered    Last Updated: 10-May-2023 07:55 by Reyes, Roland (MD)

## 2024-06-25 ENCOUNTER — LAB (OUTPATIENT)
Dept: LAB | Facility: LAB | Age: 69
End: 2024-06-25
Payer: COMMERCIAL

## 2024-06-25 DIAGNOSIS — E11.9 TYPE 2 DIABETES MELLITUS WITHOUT COMPLICATIONS (MULTI): ICD-10-CM

## 2024-06-25 DIAGNOSIS — I10 ESSENTIAL (PRIMARY) HYPERTENSION: Primary | ICD-10-CM

## 2024-06-25 DIAGNOSIS — E78.49 OTHER HYPERLIPIDEMIA: ICD-10-CM

## 2024-06-25 DIAGNOSIS — E03.8 OTHER SPECIFIED HYPOTHYROIDISM: ICD-10-CM

## 2024-06-25 DIAGNOSIS — R79.9 ABNORMAL FINDING OF BLOOD CHEMISTRY, UNSPECIFIED: ICD-10-CM

## 2024-06-25 DIAGNOSIS — Z13.89 ENCOUNTER FOR SCREENING FOR OTHER DISORDER: ICD-10-CM

## 2024-06-25 LAB
ALBUMIN SERPL BCP-MCNC: 4.2 G/DL (ref 3.4–5)
ALP SERPL-CCNC: 51 U/L (ref 33–136)
ALT SERPL W P-5'-P-CCNC: 29 U/L (ref 10–52)
ANION GAP SERPL CALC-SCNC: 13 MMOL/L (ref 10–20)
AST SERPL W P-5'-P-CCNC: 19 U/L (ref 9–39)
BASOPHILS # BLD AUTO: 0.04 X10*3/UL (ref 0–0.1)
BASOPHILS NFR BLD AUTO: 0.7 %
BILIRUB SERPL-MCNC: 0.5 MG/DL (ref 0–1.2)
BUN SERPL-MCNC: 21 MG/DL (ref 6–23)
CALCIUM SERPL-MCNC: 9.3 MG/DL (ref 8.6–10.3)
CHLORIDE SERPL-SCNC: 101 MMOL/L (ref 98–107)
CHOLEST SERPL-MCNC: 149 MG/DL (ref 0–199)
CHOLESTEROL/HDL RATIO: 3.7
CO2 SERPL-SCNC: 28 MMOL/L (ref 21–32)
CREAT SERPL-MCNC: 0.76 MG/DL (ref 0.5–1.3)
EGFRCR SERPLBLD CKD-EPI 2021: >90 ML/MIN/1.73M*2
EOSINOPHIL # BLD AUTO: 0.31 X10*3/UL (ref 0–0.7)
EOSINOPHIL NFR BLD AUTO: 5.5 %
ERYTHROCYTE [DISTWIDTH] IN BLOOD BY AUTOMATED COUNT: 14 % (ref 11.5–14.5)
EST. AVERAGE GLUCOSE BLD GHB EST-MCNC: 134 MG/DL
GLUCOSE SERPL-MCNC: 99 MG/DL (ref 74–99)
HBA1C MFR BLD: 6.3 %
HCT VFR BLD AUTO: 44.7 % (ref 41–52)
HDLC SERPL-MCNC: 40.4 MG/DL
HGB BLD-MCNC: 14.8 G/DL (ref 13.5–17.5)
IMM GRANULOCYTES # BLD AUTO: 0.01 X10*3/UL (ref 0–0.7)
IMM GRANULOCYTES NFR BLD AUTO: 0.2 % (ref 0–0.9)
LDLC SERPL CALC-MCNC: 76 MG/DL
LYMPHOCYTES # BLD AUTO: 2.18 X10*3/UL (ref 1.2–4.8)
LYMPHOCYTES NFR BLD AUTO: 38.4 %
MCH RBC QN AUTO: 30.8 PG (ref 26–34)
MCHC RBC AUTO-ENTMCNC: 33.1 G/DL (ref 32–36)
MCV RBC AUTO: 93 FL (ref 80–100)
MONOCYTES # BLD AUTO: 0.67 X10*3/UL (ref 0.1–1)
MONOCYTES NFR BLD AUTO: 11.8 %
NEUTROPHILS # BLD AUTO: 2.47 X10*3/UL (ref 1.2–7.7)
NEUTROPHILS NFR BLD AUTO: 43.4 %
NON HDL CHOLESTEROL: 109 MG/DL (ref 0–149)
NRBC BLD-RTO: 0 /100 WBCS (ref 0–0)
PLATELET # BLD AUTO: 236 X10*3/UL (ref 150–450)
POTASSIUM SERPL-SCNC: 4.1 MMOL/L (ref 3.5–5.3)
PROT SERPL-MCNC: 6.9 G/DL (ref 6.4–8.2)
RBC # BLD AUTO: 4.81 X10*6/UL (ref 4.5–5.9)
SODIUM SERPL-SCNC: 138 MMOL/L (ref 136–145)
TRIGL SERPL-MCNC: 162 MG/DL (ref 0–149)
TSH SERPL-ACNC: 3.66 MIU/L (ref 0.44–3.98)
VLDL: 32 MG/DL (ref 0–40)
WBC # BLD AUTO: 5.7 X10*3/UL (ref 4.4–11.3)

## 2024-06-25 PROCEDURE — 80053 COMPREHEN METABOLIC PANEL: CPT

## 2024-06-25 PROCEDURE — 84443 ASSAY THYROID STIM HORMONE: CPT

## 2024-06-25 PROCEDURE — 83036 HEMOGLOBIN GLYCOSYLATED A1C: CPT

## 2024-06-25 PROCEDURE — 85025 COMPLETE CBC W/AUTO DIFF WBC: CPT

## 2024-06-25 PROCEDURE — 80061 LIPID PANEL: CPT

## 2024-09-09 PROBLEM — I10 BENIGN ESSENTIAL HYPERTENSION: Status: ACTIVE | Noted: 2018-08-25

## 2024-09-09 PROBLEM — I70.219 EXTREMITY ATHEROSCLEROSIS WITH INTERMITTENT CLAUDICATION (CMS-HCC): Status: ACTIVE | Noted: 2024-09-09

## 2024-09-09 PROBLEM — Z96.652 S/P LEFT UNICOMPARTMENTAL KNEE REPLACEMENT: Status: ACTIVE | Noted: 2021-11-08

## 2024-09-09 PROBLEM — N13.8 BPH WITH OBSTRUCTION/LOWER URINARY TRACT SYMPTOMS: Status: ACTIVE | Noted: 2020-11-24

## 2024-09-09 PROBLEM — R53.83 FATIGUE: Status: ACTIVE | Noted: 2021-04-27

## 2024-09-09 PROBLEM — Z86.73 HISTORY OF TIAS: Status: ACTIVE | Noted: 2024-09-09

## 2024-09-09 PROBLEM — F32.A DEPRESSION: Status: ACTIVE | Noted: 2024-09-09

## 2024-09-09 PROBLEM — N40.1 BPH WITH OBSTRUCTION/LOWER URINARY TRACT SYMPTOMS: Status: ACTIVE | Noted: 2020-11-24

## 2024-09-09 PROBLEM — G62.9 PERIPHERAL NERVE DISEASE: Status: ACTIVE | Noted: 2024-09-09

## 2024-09-09 PROBLEM — R10.2 PELVIC PAIN: Status: ACTIVE | Noted: 2024-01-25

## 2024-09-09 PROBLEM — M54.50 LOWER BACK PAIN: Status: ACTIVE | Noted: 2024-09-09

## 2024-09-09 PROBLEM — N31.9 BLADDER DYSFUNCTION: Status: ACTIVE | Noted: 2024-09-09

## 2024-09-09 PROBLEM — S39.012A LUMBAR STRAIN: Status: ACTIVE | Noted: 2024-09-09

## 2024-09-09 PROBLEM — G47.33 OSA (OBSTRUCTIVE SLEEP APNEA): Status: ACTIVE | Noted: 2017-06-15

## 2024-09-09 PROBLEM — E11.9 DIABETES MELLITUS (MULTI): Status: ACTIVE | Noted: 2024-09-09

## 2024-09-09 PROBLEM — E55.9 VITAMIN D DEFICIENCY: Status: ACTIVE | Noted: 2024-09-09

## 2024-09-09 PROBLEM — E78.5 HYPERLIPIDEMIA: Status: ACTIVE | Noted: 2024-09-09

## 2024-09-09 PROBLEM — I10 HTN (HYPERTENSION): Status: ACTIVE | Noted: 2018-08-25

## 2024-09-09 PROBLEM — R42 DIZZINESS: Status: ACTIVE | Noted: 2024-09-09

## 2024-09-09 PROBLEM — R39.11 URINARY HESITANCY: Status: ACTIVE | Noted: 2022-02-14

## 2024-09-09 PROBLEM — M79.604 PAIN OF RIGHT LOWER EXTREMITY: Status: ACTIVE | Noted: 2018-08-16

## 2024-09-09 PROBLEM — M19.90 ARTHRITIS: Status: ACTIVE | Noted: 2024-09-09

## 2024-09-09 PROBLEM — M70.61 TROCHANTERIC BURSITIS OF RIGHT HIP: Status: ACTIVE | Noted: 2021-11-08

## 2024-09-09 PROBLEM — J45.909 REACTIVE AIRWAY DISEASE (HHS-HCC): Status: ACTIVE | Noted: 2021-09-12

## 2024-09-09 PROBLEM — J44.9 COPD (CHRONIC OBSTRUCTIVE PULMONARY DISEASE) (MULTI): Status: ACTIVE | Noted: 2024-09-09

## 2024-09-09 PROBLEM — N40.0 BENIGN PROSTATIC HYPERPLASIA: Status: ACTIVE | Noted: 2024-09-09

## 2024-09-09 PROBLEM — R06.02 SHORTNESS OF BREATH: Status: ACTIVE | Noted: 2021-04-27

## 2024-09-09 PROBLEM — N31.8 FREQUENCY-URGENCY SYNDROME: Status: ACTIVE | Noted: 2020-11-24

## 2024-09-09 PROBLEM — E11.42 TYPE 2 DIABETES MELLITUS WITH PERIPHERAL NEUROPATHY: Status: ACTIVE | Noted: 2024-01-25

## 2024-09-09 PROBLEM — M79.605 PAIN OF LEFT LOWER EXTREMITY: Status: ACTIVE | Noted: 2018-08-16

## 2024-09-09 PROBLEM — E78.5 DYSLIPIDEMIA: Status: ACTIVE | Noted: 2018-08-25

## 2024-09-09 PROBLEM — Z86.59 HISTORY OF DEPRESSION: Status: ACTIVE | Noted: 2024-09-09

## 2024-09-09 PROBLEM — L82.1 SEBORRHEIC KERATOSIS: Status: ACTIVE | Noted: 2017-10-26

## 2024-09-09 PROBLEM — R93.89 MEDIASTINAL SHIFT: Status: ACTIVE | Noted: 2018-11-02

## 2024-09-11 ENCOUNTER — APPOINTMENT (OUTPATIENT)
Dept: PLASTIC SURGERY | Facility: CLINIC | Age: 69
End: 2024-09-11
Payer: MEDICARE

## 2024-09-20 ENCOUNTER — PREP FOR PROCEDURE (OUTPATIENT)
Dept: PREADMISSION TESTING | Facility: HOSPITAL | Age: 69
End: 2024-09-20

## 2024-09-20 ENCOUNTER — APPOINTMENT (OUTPATIENT)
Dept: PLASTIC SURGERY | Facility: CLINIC | Age: 69
End: 2024-09-20
Payer: MEDICARE

## 2024-09-20 ENCOUNTER — HOSPITAL ENCOUNTER (OUTPATIENT)
Dept: RADIOLOGY | Facility: CLINIC | Age: 69
Discharge: HOME | End: 2024-09-20
Payer: MEDICARE

## 2024-09-20 VITALS
HEIGHT: 69 IN | WEIGHT: 255 LBS | DIASTOLIC BLOOD PRESSURE: 76 MMHG | SYSTOLIC BLOOD PRESSURE: 138 MMHG | BODY MASS INDEX: 37.77 KG/M2

## 2024-09-20 DIAGNOSIS — M65.311 TRIGGER THUMB, RIGHT THUMB: ICD-10-CM

## 2024-09-20 DIAGNOSIS — M79.644 PAIN OF RIGHT THUMB: ICD-10-CM

## 2024-09-20 DIAGNOSIS — M79.644 PAIN OF RIGHT THUMB: Primary | ICD-10-CM

## 2024-09-20 DIAGNOSIS — Z01.818 PRE-OPERATIVE CLEARANCE: ICD-10-CM

## 2024-09-20 DIAGNOSIS — M65.311 TRIGGER THUMB OF RIGHT HAND: Primary | ICD-10-CM

## 2024-09-20 DIAGNOSIS — M79.644 PAIN IN RIGHT FINGER(S): ICD-10-CM

## 2024-09-20 PROCEDURE — 73140 X-RAY EXAM OF FINGER(S): CPT | Mod: RT

## 2024-09-20 PROCEDURE — 99213 OFFICE O/P EST LOW 20 MIN: CPT | Performed by: PLASTIC SURGERY

## 2024-09-20 PROCEDURE — 1159F MED LIST DOCD IN RCRD: CPT | Performed by: PLASTIC SURGERY

## 2024-09-20 PROCEDURE — 3075F SYST BP GE 130 - 139MM HG: CPT | Performed by: PLASTIC SURGERY

## 2024-09-20 PROCEDURE — 3044F HG A1C LEVEL LT 7.0%: CPT | Performed by: PLASTIC SURGERY

## 2024-09-20 PROCEDURE — 3008F BODY MASS INDEX DOCD: CPT | Performed by: PLASTIC SURGERY

## 2024-09-20 PROCEDURE — 3048F LDL-C <100 MG/DL: CPT | Performed by: PLASTIC SURGERY

## 2024-09-20 PROCEDURE — 1036F TOBACCO NON-USER: CPT | Performed by: PLASTIC SURGERY

## 2024-09-20 PROCEDURE — 1125F AMNT PAIN NOTED PAIN PRSNT: CPT | Performed by: PLASTIC SURGERY

## 2024-09-20 PROCEDURE — 3078F DIAST BP <80 MM HG: CPT | Performed by: PLASTIC SURGERY

## 2024-09-20 RX ORDER — CEFAZOLIN SODIUM 2 G/50ML
2 SOLUTION INTRAVENOUS ONCE
OUTPATIENT
Start: 2024-10-08

## 2024-09-20 RX ORDER — SODIUM CHLORIDE, SODIUM LACTATE, POTASSIUM CHLORIDE, CALCIUM CHLORIDE 600; 310; 30; 20 MG/100ML; MG/100ML; MG/100ML; MG/100ML
20 INJECTION, SOLUTION INTRAVENOUS CONTINUOUS
OUTPATIENT
Start: 2024-10-08

## 2024-09-20 ASSESSMENT — ENCOUNTER SYMPTOMS
FEVER: 0
CHILLS: 0
OCCASIONAL FEELINGS OF UNSTEADINESS: 0

## 2024-09-20 ASSESSMENT — PAIN SCALES - GENERAL: PAINLEVEL: 4

## 2024-09-20 NOTE — PROGRESS NOTES
"Reason for Visit: H&P    Assessment and Plan:  Problem List Items Addressed This Visit    None  Visit Diagnoses       Pain of right thumb    -  Primary    Relevant Orders    XR thumb right MIN 2 views          Right trigger thumb    HPI:    69-year-old male with right trigger thumb refractory to conservative treatment.  Patient is now to undergo a right trigger thumb release under [facility] block anesthesia  ROS otherwise negative aside from what was mentioned above in HPI.    Vitals  /76 (BP Location: Left arm, Patient Position: Sitting)   Ht 1.753 m (5' 9\")   Wt 116 kg (255 lb) Comment: per  pt not measured  BMI 37.66 kg/m²   Body mass index is 37.66 kg/m².  Physical Exam  Gen: Alert, NAD  HEENT:  Normal exam  Neck:  No masses/nodes palpable; Thyroid nontender and without nodules; No SULY  Respiratory:  Lungs CTAB  CV: RRR  Neuro:  Gross motor and sensory intact  Skin:  No suspicious lesions present  Breast: No masses or axillary lymphadenopathy  Extremities full range of motion; tenderness at the site of the A1 pulley with obvious locking right thumb    Active Problem List  Patient Active Problem List   Diagnosis    Actinic keratoses    Arthritis    Hydrarthrosis    Basal cell carcinoma of left ear    Benign essential hypertension    Bladder dysfunction    Benign prostatic hyperplasia    BPH with obstruction/lower urinary tract symptoms    COPD (chronic obstructive pulmonary disease) (Multi)    Degeneration of lumbar or lumbosacral intervertebral disc    Depression    Dizziness    Dyslipidemia    Epidermoid cyst    Extremity atherosclerosis with intermittent claudication (CMS-HCC)    Fatigue    Frequency-urgency syndrome    Hemangioma    History of depression    History of basal cell carcinoma    History of squamous cell carcinoma    History of TIAs    History of total knee replacement    S/P left unicompartmental knee replacement    HTN (hypertension)    Hyperlipidemia    Intermittent hydrarthrosis of left " knee    Localized osteoarthrosis, lower leg    Osteoarthritis of knee    Patellofemoral arthritis of left knee    Right medial tibial plateau fracture    Lower back pain    Lumbar stenosis with neurogenic claudication    Lumbar strain    Mediastinal shift    BLANQUITA (obstructive sleep apnea)    Pain of left lower extremity    Pain of right lower extremity    Pelvic pain    Peripheral nerve disease    Periprosthetic fracture around internal prosthetic right knee joint    Post-traumatic osteoarthritis of right knee    Primary osteoarthritis of both knees    Reactive airway disease (HHS-HCC)    Right-sided low back pain with right-sided sciatica    Seborrheic keratosis    Shortness of breath    Trochanteric bursitis of right hip    Diabetes mellitus (Multi)    Type 2 diabetes mellitus with peripheral neuropathy (Multi)    Urinary hesitancy    Vitamin D deficiency       Comprehensive Medical/Surgical/Social/Family History  Past Medical History:   Diagnosis Date    Personal history of other diseases of the circulatory system     History of hypertension    Personal history of other mental and behavioral disorders     History of depression     Past Surgical History:   Procedure Laterality Date    KNEE SURGERY  11/27/2013    Knee Surgery     Social History     Social History Narrative    Not on file       Allergies and Medications  Atorvastatin, Metformin, and Other  Current Outpatient Medications   Medication Instructions    albuterol 90 mcg/actuation inhaler 2 puffs, inhalation, 4 times daily    aluminum chloride (Drysol) 20 % external solution Drysol 20 % External Solution Quantity: 35 Refills: 0 Ordered: 1-Mar-2023 DO Start : 28-Feb-2023 Complete    amLODIPine (NORVASC) 5 mg, oral, Daily    cholecalciferol (Vitamin D-3) 25 MCG (1000 UT) tablet oral    clotrimazole-betamethasone (Lotrisone) cream APPLY TOPICALLY 2 TIMES A DAY TO AFFECTED SKIN    coenzyme Q-10 100 mg capsule oral    finasteride (PROSCAR) 5 mg, oral, Daily RT     gabapentin (Neurontin) 300 mg capsule TAKE 1 CAPSULE TWICE DAILY AS DIRECTED    hydrocortisone 2.5 % cream Apply small amount twice a day as needed    ketoconazole (NIZOral) 2 % cream Apply to affected area twice daily.  Feet    lancets 33 gauge misc USE 1 LANCET AS DIRECTED TWICE A DAY    lisinopriL-hydrochlorothiazide 20-12.5 mg tablet 1 tablet, oral, 2 times daily, AS DIRECTED    multivitamin (Daily Multi-Vitamin) tablet oral    naproxen (NAPROSYN) 500 mg, oral, 2 times daily, AS DIRECTED    OneTouch Verio test strips strip TEST TWICE A DAY AS DIRECTED    rosuvastatin (CRESTOR) 5 mg, oral, Daily, as directed    tamsulosin (Flomax) 0.4 mg 24 hr capsule 1 capsule, oral, 2 times daily   Impression: Right trigger thumb symptomatic  Recommendation right trigger thumb release under [place] block anesthesia    We have reviewed the consent form, paragraph by paragraph regarding the possible complications.  Patient appears to understand and wishes to proceed and has provided both written and  verbal consent in agreement.

## 2024-09-20 NOTE — PROGRESS NOTES
thumbSubjective   Patient ID: Jason Albarran is a 69 y.o. male.    HPI  69-year-old male right-hand-dominant with right thumb trigger refractory to conservative treatment.  Patient is now being seen for management  Review of Systems   Constitutional:  Negative for chills and fever.       Objective   Physical Exam  Well-developed patient in no acute distress  Examination HEENT within normal limits  Neck supple no observable masses  Lungs clear to auscultation  Heart regular rate and rhythm  Abdomen soft nontender  Extremities full range of motion; right thumb trigger with tenderness at the A1 pulley  Neurological exam is grossly within normal limits  Assessment/Plan   There are no diagnoses linked to this encounter.    Impression: Right thumb trigger symptomatic  Recommendation right trigger thumb release under [facility] block anesthesia

## 2024-10-16 ENCOUNTER — PREP FOR PROCEDURE (OUTPATIENT)
Dept: SURGERY | Facility: HOSPITAL | Age: 69
End: 2024-10-16
Payer: MEDICARE

## 2024-10-16 ENCOUNTER — APPOINTMENT (OUTPATIENT)
Dept: PLASTIC SURGERY | Facility: CLINIC | Age: 69
End: 2024-10-16
Payer: COMMERCIAL

## 2024-10-16 VITALS
HEIGHT: 69 IN | SYSTOLIC BLOOD PRESSURE: 138 MMHG | WEIGHT: 255 LBS | BODY MASS INDEX: 37.77 KG/M2 | DIASTOLIC BLOOD PRESSURE: 78 MMHG

## 2024-10-16 DIAGNOSIS — M65.311 TRIGGER THUMB, RIGHT THUMB: Primary | ICD-10-CM

## 2024-10-16 ASSESSMENT — PAIN SCALES - GENERAL: PAINLEVEL_OUTOF10: 3

## 2024-10-16 NOTE — H&P (VIEW-ONLY)
"Reason for Visit: H&P    Assessment and Plan:  Problem List Items Addressed This Visit    None    Right trigger thumb    HPI:  69-year-old male with a right trigger thumb refractory to conservative treatment.  Patient is now to undergo a right trigger thumb release under [facility] block anesthesia    ROS otherwise negative aside from what was mentioned above in HPI.    Vitals  /78 (BP Location: Left arm, Patient Position: Sitting)   Ht 1.753 m (5' 9\")   Wt 116 kg (255 lb)   BMI 37.66 kg/m²   Body mass index is 37.66 kg/m².  Physical Exam  Gen: Alert, NAD  HEENT:  Normal exam  Neck:  No masses/nodes palpable; Thyroid nontender and without nodules; No SULY  Respiratory:  Lungs CTAB  CV: RRR  Neuro:  Gross motor and sensory intact  Skin:  No suspicious lesions present  Breast: No masses or axillary lymphadenopathy  Extremities full range of motion; right thumb with tenderness at site of the A1 pulley.  Patient has obvious locking    Active Problem List  Patient Active Problem List   Diagnosis    Actinic keratoses    Arthritis    Hydrarthrosis    Basal cell carcinoma of left ear    Benign essential hypertension    Bladder dysfunction    Benign prostatic hyperplasia    BPH with obstruction/lower urinary tract symptoms    COPD (chronic obstructive pulmonary disease) (Multi)    Degeneration of lumbar or lumbosacral intervertebral disc    Depression    Dizziness    Dyslipidemia    Epidermoid cyst    Extremity atherosclerosis with intermittent claudication (CMS-HCC)    Fatigue    Frequency-urgency syndrome    Hemangioma    History of depression    History of basal cell carcinoma    History of squamous cell carcinoma    History of TIAs    History of total knee replacement    S/P left unicompartmental knee replacement    HTN (hypertension)    Hyperlipidemia    Intermittent hydrarthrosis of left knee    Localized osteoarthrosis, lower leg    Osteoarthritis of knee    Patellofemoral arthritis of left knee    Right medial " tibial plateau fracture    Lower back pain    Lumbar stenosis with neurogenic claudication    Lumbar strain    Mediastinal shift    BLANQUITA (obstructive sleep apnea)    Pain of left lower extremity    Pain of right lower extremity    Pelvic pain    Peripheral nerve disease    Periprosthetic fracture around internal prosthetic right knee joint    Post-traumatic osteoarthritis of right knee    Primary osteoarthritis of both knees    Reactive airway disease (HHS-HCC)    Right-sided low back pain with right-sided sciatica    Seborrheic keratosis    Shortness of breath    Trochanteric bursitis of right hip    Diabetes mellitus (Multi)    Type 2 diabetes mellitus with peripheral neuropathy    Urinary hesitancy    Vitamin D deficiency    Trigger thumb of right hand       Comprehensive Medical/Surgical/Social/Family History  Past Medical History:   Diagnosis Date    Personal history of other diseases of the circulatory system     History of hypertension    Personal history of other mental and behavioral disorders     History of depression     Past Surgical History:   Procedure Laterality Date    KNEE SURGERY  11/27/2013    Knee Surgery     Social History     Social History Narrative    Not on file       Allergies and Medications  Atorvastatin, Metformin, and Other  Current Outpatient Medications   Medication Instructions    albuterol 90 mcg/actuation inhaler 2 puffs, 4 times daily    aluminum chloride (Drysol) 20 % external solution Drysol 20 % External Solution Quantity: 35 Refills: 0 Ordered: 1-Mar-2023 DO Start : 28-Feb-2023 Complete    amLODIPine (NORVASC) 5 mg, Daily    cholecalciferol (Vitamin D-3) 25 MCG (1000 UT) tablet Take by mouth.    clotrimazole-betamethasone (Lotrisone) cream APPLY TOPICALLY 2 TIMES A DAY TO AFFECTED SKIN    coenzyme Q-10 100 mg capsule Take by mouth.    finasteride (PROSCAR) 5 mg, Daily RT    gabapentin (Neurontin) 300 mg capsule TAKE 1 CAPSULE TWICE DAILY AS DIRECTED    hydrocortisone 2.5 %  cream Apply small amount twice a day as needed    ketoconazole (NIZOral) 2 % cream Apply to affected area twice daily.  Feet    lancets 33 gauge misc USE 1 LANCET AS DIRECTED TWICE A DAY    lisinopriL-hydrochlorothiazide 20-12.5 mg tablet 1 tablet, 2 times daily    multivitamin (Daily Multi-Vitamin) tablet Take by mouth.    naproxen (NAPROSYN) 500 mg, 2 times daily    OneTouch Verio test strips strip TEST TWICE A DAY AS DIRECTED    rosuvastatin (CRESTOR) 5 mg, Daily    tamsulosin (Flomax) 0.4 mg 24 hr capsule 1 capsule, 2 times daily     Impression: Right trigger thumb  Recommendation right trigger thumb release under [place] block anesthesia  We have reviewed the consent form, paragraph by paragraph regarding the possible complications.  Patient appears to understand and wishes to proceed and has provided both written and  verbal consent in agreement.

## 2024-10-16 NOTE — PROGRESS NOTES
"Reason for Visit: H&P    Assessment and Plan:  Problem List Items Addressed This Visit    None    Right trigger thumb    HPI:  69-year-old male with a right trigger thumb refractory to conservative treatment.  Patient is now to undergo a right trigger thumb release under [facility]a block anesthesia    ROS otherwise negative aside from what was mentioned above in HPI.    Vitals  /78 (BP Location: Left arm, Patient Position: Sitting)   Ht 1.753 m (5' 9\")   Wt 116 kg (255 lb)   BMI 37.66 kg/m²   Body mass index is 37.66 kg/m².  Physical Exam  Gen: Alert, NAD  HEENT:  Normal exam  Neck:  No masses/nodes palpable; Thyroid nontender and without nodules; No SULY  Respiratory:  Lungs CTAB  CV: RRR  Neuro:  Gross motor and sensory intact  Skin:  No suspicious lesions present  Breast: No masses or axillary lymphadenopathy  Extremities full range of motion; right thumb with tenderness at site of the A1 pulley.  Patient has obvious locking    Active Problem List  Patient Active Problem List   Diagnosis    Actinic keratoses    Arthritis    Hydrarthrosis    Basal cell carcinoma of left ear    Benign essential hypertension    Bladder dysfunction    Benign prostatic hyperplasia    BPH with obstruction/lower urinary tract symptoms    COPD (chronic obstructive pulmonary disease) (Multi)    Degeneration of lumbar or lumbosacral intervertebral disc    Depression    Dizziness    Dyslipidemia    Epidermoid cyst    Extremity atherosclerosis with intermittent claudication (CMS-HCC)    Fatigue    Frequency-urgency syndrome    Hemangioma    History of depression    History of basal cell carcinoma    History of squamous cell carcinoma    History of TIAs    History of total knee replacement    S/P left unicompartmental knee replacement    HTN (hypertension)    Hyperlipidemia    Intermittent hydrarthrosis of left knee    Localized osteoarthrosis, lower leg    Osteoarthritis of knee    Patellofemoral arthritis of left knee    Right medial " tibial plateau fracture    Lower back pain    Lumbar stenosis with neurogenic claudication    Lumbar strain    Mediastinal shift    BLANQUITA (obstructive sleep apnea)    Pain of left lower extremity    Pain of right lower extremity    Pelvic pain    Peripheral nerve disease    Periprosthetic fracture around internal prosthetic right knee joint    Post-traumatic osteoarthritis of right knee    Primary osteoarthritis of both knees    Reactive airway disease (HHS-HCC)    Right-sided low back pain with right-sided sciatica    Seborrheic keratosis    Shortness of breath    Trochanteric bursitis of right hip    Diabetes mellitus (Multi)    Type 2 diabetes mellitus with peripheral neuropathy    Urinary hesitancy    Vitamin D deficiency    Trigger thumb of right hand       Comprehensive Medical/Surgical/Social/Family History  Past Medical History:   Diagnosis Date    Personal history of other diseases of the circulatory system     History of hypertension    Personal history of other mental and behavioral disorders     History of depression     Past Surgical History:   Procedure Laterality Date    KNEE SURGERY  11/27/2013    Knee Surgery     Social History     Social History Narrative    Not on file       Allergies and Medications  Atorvastatin, Metformin, and Other  Current Outpatient Medications   Medication Instructions    albuterol 90 mcg/actuation inhaler 2 puffs, 4 times daily    aluminum chloride (Drysol) 20 % external solution Drysol 20 % External Solution Quantity: 35 Refills: 0 Ordered: 1-Mar-2023 DO Start : 28-Feb-2023 Complete    amLODIPine (NORVASC) 5 mg, Daily    cholecalciferol (Vitamin D-3) 25 MCG (1000 UT) tablet Take by mouth.    clotrimazole-betamethasone (Lotrisone) cream APPLY TOPICALLY 2 TIMES A DAY TO AFFECTED SKIN    coenzyme Q-10 100 mg capsule Take by mouth.    finasteride (PROSCAR) 5 mg, Daily RT    gabapentin (Neurontin) 300 mg capsule TAKE 1 CAPSULE TWICE DAILY AS DIRECTED    hydrocortisone 2.5 %  cream Apply small amount twice a day as needed    ketoconazole (NIZOral) 2 % cream Apply to affected area twice daily.  Feet    lancets 33 gauge misc USE 1 LANCET AS DIRECTED TWICE A DAY    lisinopriL-hydrochlorothiazide 20-12.5 mg tablet 1 tablet, 2 times daily    multivitamin (Daily Multi-Vitamin) tablet Take by mouth.    naproxen (NAPROSYN) 500 mg, 2 times daily    OneTouch Verio test strips strip TEST TWICE A DAY AS DIRECTED    rosuvastatin (CRESTOR) 5 mg, Daily    tamsulosin (Flomax) 0.4 mg 24 hr capsule 1 capsule, 2 times daily     Impression: Right trigger thumb  Recommendation right trigger thumb release under [place] block anesthesia  We have reviewed the consent form, paragraph by paragraph regarding the possible complications.  Patient appears to understand and wishes to proceed and has provided both written and  verbal consent in agreement.

## 2024-10-25 ENCOUNTER — APPOINTMENT (OUTPATIENT)
Dept: CARDIOLOGY | Facility: HOSPITAL | Age: 69
End: 2024-10-25
Payer: MEDICARE

## 2024-10-25 ENCOUNTER — LAB (OUTPATIENT)
Dept: LAB | Facility: HOSPITAL | Age: 69
End: 2024-10-25
Payer: MEDICARE

## 2024-10-25 ENCOUNTER — HOSPITAL ENCOUNTER (OUTPATIENT)
Dept: CARDIOLOGY | Facility: HOSPITAL | Age: 69
Discharge: HOME | End: 2024-10-25
Payer: MEDICARE

## 2024-10-25 DIAGNOSIS — M79.644 PAIN IN RIGHT FINGER(S): ICD-10-CM

## 2024-10-25 DIAGNOSIS — Z01.818 PRE-OPERATIVE CLEARANCE: ICD-10-CM

## 2024-10-25 DIAGNOSIS — M65.311 TRIGGER THUMB OF RIGHT HAND: ICD-10-CM

## 2024-10-25 LAB
ALBUMIN SERPL BCP-MCNC: 4.1 G/DL (ref 3.4–5)
ALP SERPL-CCNC: 47 U/L (ref 33–136)
ALT SERPL W P-5'-P-CCNC: 23 U/L (ref 10–52)
ANION GAP SERPL CALC-SCNC: 11 MMOL/L (ref 10–20)
APTT PPP: 29 SECONDS (ref 27–38)
AST SERPL W P-5'-P-CCNC: 17 U/L (ref 9–39)
BILIRUB SERPL-MCNC: 0.4 MG/DL (ref 0–1.2)
BUN SERPL-MCNC: 26 MG/DL (ref 6–23)
CALCIUM SERPL-MCNC: 9.2 MG/DL (ref 8.6–10.3)
CHLORIDE SERPL-SCNC: 103 MMOL/L (ref 98–107)
CO2 SERPL-SCNC: 29 MMOL/L (ref 21–32)
CREAT SERPL-MCNC: 0.77 MG/DL (ref 0.5–1.3)
EGFRCR SERPLBLD CKD-EPI 2021: >90 ML/MIN/1.73M*2
ERYTHROCYTE [DISTWIDTH] IN BLOOD BY AUTOMATED COUNT: 13.6 % (ref 11.5–14.5)
GLUCOSE SERPL-MCNC: 109 MG/DL (ref 74–99)
HCT VFR BLD AUTO: 42 % (ref 41–52)
HGB BLD-MCNC: 14.2 G/DL (ref 13.5–17.5)
INR PPP: 0.9 (ref 0.9–1.1)
MCH RBC QN AUTO: 31.1 PG (ref 26–34)
MCHC RBC AUTO-ENTMCNC: 33.8 G/DL (ref 32–36)
MCV RBC AUTO: 92 FL (ref 80–100)
NRBC BLD-RTO: 0 /100 WBCS (ref 0–0)
PLATELET # BLD AUTO: 216 X10*3/UL (ref 150–450)
POTASSIUM SERPL-SCNC: 4.4 MMOL/L (ref 3.5–5.3)
PROT SERPL-MCNC: 6.7 G/DL (ref 6.4–8.2)
PROTHROMBIN TIME: 10.5 SECONDS (ref 9.8–12.8)
RBC # BLD AUTO: 4.57 X10*6/UL (ref 4.5–5.9)
SODIUM SERPL-SCNC: 139 MMOL/L (ref 136–145)
WBC # BLD AUTO: 6.2 X10*3/UL (ref 4.4–11.3)

## 2024-10-25 PROCEDURE — 36415 COLL VENOUS BLD VENIPUNCTURE: CPT

## 2024-10-25 PROCEDURE — 93005 ELECTROCARDIOGRAM TRACING: CPT

## 2024-10-25 PROCEDURE — 93010 ELECTROCARDIOGRAM REPORT: CPT | Performed by: INTERNAL MEDICINE

## 2024-10-25 PROCEDURE — 80053 COMPREHEN METABOLIC PANEL: CPT

## 2024-10-25 PROCEDURE — 85610 PROTHROMBIN TIME: CPT

## 2024-10-25 PROCEDURE — 85027 COMPLETE CBC AUTOMATED: CPT

## 2024-10-27 LAB
ATRIAL RATE: 56 BPM
P AXIS: 25 DEGREES
P OFFSET: 175 MS
P ONSET: 110 MS
PR INTERVAL: 232 MS
Q ONSET: 226 MS
QRS COUNT: 9 BEATS
QRS DURATION: 96 MS
QT INTERVAL: 420 MS
QTC CALCULATION(BAZETT): 405 MS
QTC FREDERICIA: 410 MS
R AXIS: 38 DEGREES
T AXIS: 39 DEGREES
T OFFSET: 436 MS
VENTRICULAR RATE: 56 BPM

## 2024-10-28 NOTE — PREPROCEDURE INSTRUCTIONS

## 2024-11-05 ENCOUNTER — ANESTHESIA EVENT (OUTPATIENT)
Dept: OPERATING ROOM | Facility: HOSPITAL | Age: 69
End: 2024-11-05
Payer: MEDICARE

## 2024-11-05 ENCOUNTER — HOSPITAL ENCOUNTER (OUTPATIENT)
Facility: HOSPITAL | Age: 69
Setting detail: OUTPATIENT SURGERY
Discharge: HOME | End: 2024-11-05
Attending: PLASTIC SURGERY | Admitting: PLASTIC SURGERY
Payer: MEDICARE

## 2024-11-05 ENCOUNTER — ANESTHESIA (OUTPATIENT)
Dept: OPERATING ROOM | Facility: HOSPITAL | Age: 69
End: 2024-11-05
Payer: MEDICARE

## 2024-11-05 VITALS
OXYGEN SATURATION: 94 % | HEART RATE: 55 BPM | HEIGHT: 70 IN | SYSTOLIC BLOOD PRESSURE: 126 MMHG | TEMPERATURE: 96.8 F | RESPIRATION RATE: 16 BRPM | BODY MASS INDEX: 37.12 KG/M2 | WEIGHT: 259.26 LBS | DIASTOLIC BLOOD PRESSURE: 62 MMHG

## 2024-11-05 DIAGNOSIS — M65.311 TRIGGER THUMB OF RIGHT HAND: Primary | ICD-10-CM

## 2024-11-05 LAB — GLUCOSE BLD MANUAL STRIP-MCNC: 114 MG/DL (ref 74–99)

## 2024-11-05 PROCEDURE — 3700000002 HC GENERAL ANESTHESIA TIME - EACH INCREMENTAL 1 MINUTE: Performed by: PLASTIC SURGERY

## 2024-11-05 PROCEDURE — 3700000001 HC GENERAL ANESTHESIA TIME - INITIAL BASE CHARGE: Performed by: PLASTIC SURGERY

## 2024-11-05 PROCEDURE — 2500000004 HC RX 250 GENERAL PHARMACY W/ HCPCS (ALT 636 FOR OP/ED): Performed by: PLASTIC SURGERY

## 2024-11-05 PROCEDURE — 2500000004 HC RX 250 GENERAL PHARMACY W/ HCPCS (ALT 636 FOR OP/ED): Mod: JZ | Performed by: PLASTIC SURGERY

## 2024-11-05 PROCEDURE — 2500000004 HC RX 250 GENERAL PHARMACY W/ HCPCS (ALT 636 FOR OP/ED): Performed by: ANESTHESIOLOGY

## 2024-11-05 PROCEDURE — 7100000009 HC PHASE TWO TIME - INITIAL BASE CHARGE: Performed by: PLASTIC SURGERY

## 2024-11-05 PROCEDURE — 3600000003 HC OR TIME - INITIAL BASE CHARGE - PROCEDURE LEVEL THREE: Performed by: PLASTIC SURGERY

## 2024-11-05 PROCEDURE — 3600000008 HC OR TIME - EACH INCREMENTAL 1 MINUTE - PROCEDURE LEVEL THREE: Performed by: PLASTIC SURGERY

## 2024-11-05 PROCEDURE — 7100000010 HC PHASE TWO TIME - EACH INCREMENTAL 1 MINUTE: Performed by: PLASTIC SURGERY

## 2024-11-05 PROCEDURE — 26055 INCISE FINGER TENDON SHEATH: CPT | Performed by: PLASTIC SURGERY

## 2024-11-05 PROCEDURE — 82947 ASSAY GLUCOSE BLOOD QUANT: CPT

## 2024-11-05 RX ORDER — ACETAMINOPHEN AND CODEINE PHOSPHATE 300; 30 MG/1; MG/1
1 TABLET ORAL EVERY 8 HOURS PRN
Qty: 15 TABLET | Refills: 0 | Status: SHIPPED | OUTPATIENT
Start: 2024-11-05 | End: 2024-11-10

## 2024-11-05 RX ORDER — BUPIVACAINE HYDROCHLORIDE 2.5 MG/ML
INJECTION, SOLUTION INFILTRATION; PERINEURAL AS NEEDED
Status: DISCONTINUED | OUTPATIENT
Start: 2024-11-05 | End: 2024-11-05 | Stop reason: HOSPADM

## 2024-11-05 RX ORDER — CEFAZOLIN SODIUM 2 G/100ML
2 INJECTION, SOLUTION INTRAVENOUS ONCE
Status: COMPLETED | OUTPATIENT
Start: 2024-11-05 | End: 2024-11-05

## 2024-11-05 RX ORDER — KETOROLAC TROMETHAMINE 30 MG/ML
INJECTION, SOLUTION INTRAMUSCULAR; INTRAVENOUS AS NEEDED
Status: DISCONTINUED | OUTPATIENT
Start: 2024-11-05 | End: 2024-11-05

## 2024-11-05 RX ORDER — SODIUM CHLORIDE, SODIUM LACTATE, POTASSIUM CHLORIDE, CALCIUM CHLORIDE 600; 310; 30; 20 MG/100ML; MG/100ML; MG/100ML; MG/100ML
20 INJECTION, SOLUTION INTRAVENOUS CONTINUOUS
Status: DISCONTINUED | OUTPATIENT
Start: 2024-11-05 | End: 2024-11-05 | Stop reason: HOSPADM

## 2024-11-05 RX ORDER — LIDOCAINE HYDROCHLORIDE 5 MG/ML
INJECTION, SOLUTION INFILTRATION; INTRAVENOUS AS NEEDED
Status: DISCONTINUED | OUTPATIENT
Start: 2024-11-05 | End: 2024-11-05

## 2024-11-05 RX ORDER — MIDAZOLAM HYDROCHLORIDE 1 MG/ML
INJECTION, SOLUTION INTRAMUSCULAR; INTRAVENOUS AS NEEDED
Status: DISCONTINUED | OUTPATIENT
Start: 2024-11-05 | End: 2024-11-05

## 2024-11-05 SDOH — HEALTH STABILITY: MENTAL HEALTH: CURRENT SMOKER: 0

## 2024-11-05 ASSESSMENT — PAIN SCALES - GENERAL
PAINLEVEL_OUTOF10: 0 - NO PAIN
PAIN_LEVEL: 1
PAINLEVEL_OUTOF10: 0 - NO PAIN
PAINLEVEL_OUTOF10: 5 - MODERATE PAIN

## 2024-11-05 ASSESSMENT — PAIN - FUNCTIONAL ASSESSMENT
PAIN_FUNCTIONAL_ASSESSMENT: 0-10

## 2024-11-05 ASSESSMENT — COLUMBIA-SUICIDE SEVERITY RATING SCALE - C-SSRS
2. HAVE YOU ACTUALLY HAD ANY THOUGHTS OF KILLING YOURSELF?: NO
1. IN THE PAST MONTH, HAVE YOU WISHED YOU WERE DEAD OR WISHED YOU COULD GO TO SLEEP AND NOT WAKE UP?: NO
6. HAVE YOU EVER DONE ANYTHING, STARTED TO DO ANYTHING, OR PREPARED TO DO ANYTHING TO END YOUR LIFE?: NO

## 2024-11-05 ASSESSMENT — PAIN DESCRIPTION - DESCRIPTORS: DESCRIPTORS: SORE;DULL;DISCOMFORT

## 2024-11-05 NOTE — BRIEF OP NOTE
Date: 2024  OR Location: [facility]    Name: Jason Albarran, : 1955, Age: 69 y.o., MRN: [id number], Sex: male    Diagnosis  Pre-op Diagnosis      * Trigger thumb of right hand [M65.311] Post-op Diagnosis     * Trigger thumb of right hand [M65.311]     Procedures  Right Trigger Thumb Release  29272 - NV TENDON SHEATH INCISION      Surgeons      * Roland J Reyes - Primary    Resident/Fellow/Other Assistant:  Surgeons and Role:  * No surgeons found with a matching role *  Joselo Floyd  Staff:   Circulator: Marcos  Surgical Assistant: Joselo Zimmerman Person: Virgilio    Anesthesia Staff: Anesthesiologist: Gian Magaña MD    Procedure Summary  Anesthesia: Monitor Anesthesia Care  ASA: II  Estimated Blood Loss: Minimal mL  Intra-op Medications:   Administrations occurring from 0715 to 0815 on 24:   Medication Name Total Dose   BUPivacaine HCl (Marcaine) 0.25 % (2.5 mg/mL) injection 3 mL   ceFAZolin (Ancef) 2 g in dextrose (iso)  mL 2 g   ketorolac (Toradol) 30 mg 30 mg   lidocaine PF (Xylocaine) 0.5 % 25 mL   midazolam (Versed) 1 mg/1 mL 2 mg              Anesthesia Record               Intraprocedure I/O Totals       None           Specimen: No specimens collected               Findings: Right trigger thumb    Complications:  None; patient tolerated the procedure well.     Disposition: PACU - hemodynamically stable.  Condition: stable  Specimens Collected: No specimens collected  Attending Attestation:     Roland J Reyes  Phone Number: [phone]

## 2024-11-05 NOTE — OP NOTE
Operative note      Preoperative diagnosis:   Right trigger thumb    Postoperative diagnosis: Same    Procedure: Right trigger thumb release    Surgeon:              Roland Reyes MD    First Asst.: Joselo Floyd    Anesthesia: [place] block    Operative indications: 69-year-old male with a right trigger thumb refractory to conservative treatment.  Patient is now to undergo a right trigger thumb release under [place] block anesthesia          Operative procedure:    Patient was taken to the operating suite and placed in supine position.  After successful application of right upper extremity Gena block anesthesia, the right upper extremity was then prepped and draped in the appropriate sterile fashion.  Procedure was began by first verifying the operative site verbally with operating staff.  Next a transverse incision was then made over the site of the A1 pulley of the right thumb.  The incision was deepened in the subcutaneous tissue using tenotomy scissors until the A1 pulley of the flexor tendon of the right thumb was identified.  The A1 pulley was then released using tenotomy scissors under direct visualization making sure to protect the radial and ulnar digital neurovascular bundles.  After good release, the tendon was sent to check for any further blockage of excursion.  None was found.  The wound was then irrigated with normal saline.  Lausier of the incision was then done using interrupted 4-0 nylon sutures in simple interrupted fashion.  Quarter percent Marcaine was then injected for long-term pain relief.  Dressing was then applied tourniquet was then released patient tolerated the procedure well patient was then sent to [facility] in stable condition.  All instrument sponge counts were correct                    Estimated blood loss: Minimal

## 2024-11-05 NOTE — ANESTHESIA PREPROCEDURE EVALUATION
Patient: Jason Albarran    Procedure Information       Date/Time: 11/05/24 0715    Procedure: Right Trigger Thumb Release (Right: Fingers) - [facility] Block, DIABETIC    Location: [facility] 06 / [facility]    Surgeons: Roland J Reyes, MD            Relevant Problems   Cardiac   (+) Benign essential hypertension   (+) HTN (hypertension)   (+) Hyperlipidemia      Pulmonary   (+) COPD (chronic obstructive pulmonary disease) (Multi)   (+) BLANQUITA (obstructive sleep apnea)      Neuro   (+) Depression   (+) Peripheral nerve disease   (+) Right-sided low back pain with right-sided sciatica   (+) Type 2 diabetes mellitus with peripheral neuropathy      /Renal   (+) BPH with obstruction/lower urinary tract symptoms   (+) Benign prostatic hyperplasia      Endocrine   (+) Type 2 diabetes mellitus with peripheral neuropathy      Musculoskeletal   (+) Degeneration of lumbar or lumbosacral intervertebral disc   (+) Localized osteoarthrosis, lower leg   (+) Lumbar stenosis with neurogenic claudication   (+) Osteoarthritis of knee   (+) Post-traumatic osteoarthritis of right knee   (+) Primary osteoarthritis of both knees      Skin   (+) Basal cell carcinoma of left ear       Clinical information reviewed:   Tobacco  Allergies  Meds   Med Hx  Surg Hx   Fam Hx  Soc Hx        NPO Detail:  NPO/Void Status  Carbohydrate Drink Given Prior to Surgery? : N  Date of Last Liquid: 11/04/24  Time of Last Liquid: 2000  Date of Last Solid: 11/04/24  Time of Last Solid: 1800  Last Intake Type: Clear fluids  Time of Last Void: 0400         Physical Exam    Airway  Mallampati: II     Cardiovascular - normal exam  Rhythm: regular     Dental - normal exam     Pulmonary - normal exam     Abdominal - normal exam             Anesthesia Plan    History of general anesthesia?: yes  History of complications of general anesthesia?: no    ASA 2     MAC and regional     The patient is not a current smoker.    intravenous induction   Anesthetic plan and  risks discussed with patient.

## 2024-11-05 NOTE — ANESTHESIA POSTPROCEDURE EVALUATION
Patient: Jason Albarran    Procedure Summary       Date: 11/05/24 Room / Location: [facility] / [facility]    Anesthesia Start: 0727 Anesthesia Stop: 0758    Procedure: Right Trigger Thumb Release (Right: Fingers) Diagnosis:       Trigger thumb of right hand      (Trigger thumb of right hand [M65.311])    Surgeons: Roland J Reyes, MD Responsible Provider: Gian Magaña MD    Anesthesia Type: MAC, regional ASA Status: 2            Anesthesia Type: MAC, regional    Vitals Value Taken Time   /73 11/05/24 0807   Temp 36 11/05/24 0807   Pulse 49 11/05/24 0807   Resp 16 11/05/24 0807   SpO2 100 11/05/24 0807       Anesthesia Post Evaluation    Patient location during evaluation: PACU  Patient participation: complete - patient participated  Level of consciousness: awake and alert  Pain score: 1  Pain management: satisfactory to patient  Airway patency: patent  Cardiovascular status: stable  Respiratory status: acceptable  Hydration status: acceptable  Postoperative Nausea and Vomiting: none        No notable events documented.

## 2024-11-05 NOTE — DISCHARGE INSTRUCTIONS
No heavy lifting or strenuous activity with right hand  Please keep right hand in sling at all times  Please keep right hand dressing clean and dry, okay to shower with plastic bag or cast cover over the right hand  Prescription provided for Tylenol with codeine for pain  Follow-up with me in the office in 1 week    Moderate Sedation in Adults Discharge Instructions    About this topic  Moderate sedation is also known as conscious sedation. It changes your state of being awake or consciousness. With this sedation, you may feel slight pain or pressure during a procedure. The drugs help you to relax and may even allow you to sleep. It will be easy to wake you and you may talk and answer questions while under sedation. Most likely, you will not remember what happens while under this sedation.  What care is needed at home?  Ask your doctor what you need to do when you go home. Make sure you understand everything the doctor says. This way you will know what you need to do.  You will not be allowed to drive right away after the procedure. Ask a family member or a friend to drive you home.  Do not operate heavy or dangerous machinery for at least 24 hours.  Do not make major decisions or sign important papers for at least 24 hours. You may not be thinking clearly.  Avoid beer, wine, or mixed drinks (alcohol) for at least 24 hours.  You are at a higher risk of falling for at least 24 hours after moderate sedation.  Take extra care when you get up.  Do not change positions quickly.  Do not rush when you need to go to the bathroom or to answer the phone.  Ask for help if you feel unsteady when you try to walk.  Wear shoes with non-slip soles and low heels.  What follow-up care is needed?  Your doctor may ask you to make visits to the office to check on your progress. Be sure to keep these visits. Your doctor may also refer you to other doctors or tell you that you need more tests or care.  What drugs may be needed?  The  doctor may order drugs to:  Help with pain  Treat an upset stomach or throwing up  Will physical activity be limited?  Rest for the day of the procedure. Avoid strenuous activities like heavy lifting and hard exercise. Talk to your doctor about whether you need to limit lifting or exercise after your procedure.  What changes to diet are needed?  Start with a light diet when you are fully awake. This includes things that are easy to swallow like soups, pudding, jello, toast, and eggs. Slowly progress to your normal diet.  What problems could happen?  Low blood pressure  Breathing problems  Upset stomach or throwing up  Dizziness  When do I need to call the doctor?  Feel dizzy, weak, or tired  Faint  Very bad headache  Upset stomach or throwing up  To follow up for more tests or care  Teach Back: Helping You Understand  The Teach Back Method helps you understand the information we are giving you. After you talk with the staff, tell them in your own words what you learned. This helps to make sure the staff has described each thing clearly. It also helps to explain things that may have been confusing. Before going home, make sure you can do these:  I can tell you about my procedure.  I can tell you if I need more tests or care.  I can tell you what is good for me to eat and drink the next day.  I can tell you what I would do if I feel dizzy, weak, or tired.  Last Reviewed Date  2020-03-02

## 2024-11-12 ENCOUNTER — LAB (OUTPATIENT)
Dept: LAB | Facility: LAB | Age: 69
End: 2024-11-12
Payer: COMMERCIAL

## 2024-11-12 DIAGNOSIS — I10 ESSENTIAL (PRIMARY) HYPERTENSION: Primary | ICD-10-CM

## 2024-11-12 DIAGNOSIS — E03.8 OTHER SPECIFIED HYPOTHYROIDISM: ICD-10-CM

## 2024-11-12 DIAGNOSIS — Z12.5 ENCOUNTER FOR SCREENING FOR MALIGNANT NEOPLASM OF PROSTATE: ICD-10-CM

## 2024-11-12 DIAGNOSIS — E11.9 TYPE 2 DIABETES MELLITUS WITHOUT COMPLICATIONS (MULTI): ICD-10-CM

## 2024-11-12 DIAGNOSIS — E78.49 OTHER HYPERLIPIDEMIA: ICD-10-CM

## 2024-11-12 LAB
ALBUMIN SERPL BCP-MCNC: 4 G/DL (ref 3.4–5)
ALP SERPL-CCNC: 48 U/L (ref 33–136)
ALT SERPL W P-5'-P-CCNC: 30 U/L (ref 10–52)
ANION GAP SERPL CALC-SCNC: 10 MMOL/L (ref 10–20)
AST SERPL W P-5'-P-CCNC: 20 U/L (ref 9–39)
BASOPHILS # BLD AUTO: 0.03 X10*3/UL (ref 0–0.1)
BASOPHILS NFR BLD AUTO: 0.6 %
BILIRUB SERPL-MCNC: 0.4 MG/DL (ref 0–1.2)
BUN SERPL-MCNC: 23 MG/DL (ref 6–23)
CALCIUM SERPL-MCNC: 9 MG/DL (ref 8.6–10.3)
CHLORIDE SERPL-SCNC: 104 MMOL/L (ref 98–107)
CHOLEST SERPL-MCNC: 132 MG/DL (ref 0–199)
CHOLESTEROL/HDL RATIO: 3.5
CO2 SERPL-SCNC: 28 MMOL/L (ref 21–32)
CREAT SERPL-MCNC: 0.81 MG/DL (ref 0.5–1.3)
EGFRCR SERPLBLD CKD-EPI 2021: >90 ML/MIN/1.73M*2
EOSINOPHIL # BLD AUTO: 0.31 X10*3/UL (ref 0–0.7)
EOSINOPHIL NFR BLD AUTO: 6.1 %
ERYTHROCYTE [DISTWIDTH] IN BLOOD BY AUTOMATED COUNT: 13.6 % (ref 11.5–14.5)
EST. AVERAGE GLUCOSE BLD GHB EST-MCNC: 146 MG/DL
GLUCOSE SERPL-MCNC: 110 MG/DL (ref 74–99)
HBA1C MFR BLD: 6.7 %
HCT VFR BLD AUTO: 41.2 % (ref 41–52)
HDLC SERPL-MCNC: 38 MG/DL
HGB BLD-MCNC: 13.9 G/DL (ref 13.5–17.5)
IMM GRANULOCYTES # BLD AUTO: 0.02 X10*3/UL (ref 0–0.7)
IMM GRANULOCYTES NFR BLD AUTO: 0.4 % (ref 0–0.9)
LDLC SERPL CALC-MCNC: 56 MG/DL
LYMPHOCYTES # BLD AUTO: 1.91 X10*3/UL (ref 1.2–4.8)
LYMPHOCYTES NFR BLD AUTO: 37.3 %
MCH RBC QN AUTO: 31.2 PG (ref 26–34)
MCHC RBC AUTO-ENTMCNC: 33.7 G/DL (ref 32–36)
MCV RBC AUTO: 92 FL (ref 80–100)
MONOCYTES # BLD AUTO: 0.59 X10*3/UL (ref 0.1–1)
MONOCYTES NFR BLD AUTO: 11.5 %
NEUTROPHILS # BLD AUTO: 2.26 X10*3/UL (ref 1.2–7.7)
NEUTROPHILS NFR BLD AUTO: 44.1 %
NON HDL CHOLESTEROL: 94 MG/DL (ref 0–149)
NRBC BLD-RTO: 0 /100 WBCS (ref 0–0)
PLATELET # BLD AUTO: 244 X10*3/UL (ref 150–450)
POTASSIUM SERPL-SCNC: 4.1 MMOL/L (ref 3.5–5.3)
PROT SERPL-MCNC: 6.7 G/DL (ref 6.4–8.2)
PSA SERPL-MCNC: 0.15 NG/ML
RBC # BLD AUTO: 4.46 X10*6/UL (ref 4.5–5.9)
SODIUM SERPL-SCNC: 138 MMOL/L (ref 136–145)
TRIGL SERPL-MCNC: 189 MG/DL (ref 0–149)
TSH SERPL-ACNC: 3.06 MIU/L (ref 0.44–3.98)
VLDL: 38 MG/DL (ref 0–40)
WBC # BLD AUTO: 5.1 X10*3/UL (ref 4.4–11.3)

## 2024-11-12 PROCEDURE — 84443 ASSAY THYROID STIM HORMONE: CPT

## 2024-11-12 PROCEDURE — 83036 HEMOGLOBIN GLYCOSYLATED A1C: CPT

## 2024-11-12 PROCEDURE — 80061 LIPID PANEL: CPT

## 2024-11-12 PROCEDURE — 80053 COMPREHEN METABOLIC PANEL: CPT

## 2024-11-12 PROCEDURE — 84153 ASSAY OF PSA TOTAL: CPT

## 2024-11-12 PROCEDURE — 85025 COMPLETE CBC W/AUTO DIFF WBC: CPT

## 2024-11-13 ENCOUNTER — APPOINTMENT (OUTPATIENT)
Dept: PLASTIC SURGERY | Facility: CLINIC | Age: 69
End: 2024-11-13
Payer: MEDICARE

## 2024-11-13 VITALS — HEIGHT: 70 IN | BODY MASS INDEX: 37.08 KG/M2 | WEIGHT: 259 LBS

## 2024-11-13 DIAGNOSIS — M65.311 TRIGGER THUMB, RIGHT THUMB: Primary | ICD-10-CM

## 2024-11-13 PROCEDURE — 3044F HG A1C LEVEL LT 7.0%: CPT | Performed by: PLASTIC SURGERY

## 2024-11-13 PROCEDURE — 1126F AMNT PAIN NOTED NONE PRSNT: CPT | Performed by: PLASTIC SURGERY

## 2024-11-13 PROCEDURE — 1160F RVW MEDS BY RX/DR IN RCRD: CPT | Performed by: PLASTIC SURGERY

## 2024-11-13 PROCEDURE — 1036F TOBACCO NON-USER: CPT | Performed by: PLASTIC SURGERY

## 2024-11-13 PROCEDURE — 3008F BODY MASS INDEX DOCD: CPT | Performed by: PLASTIC SURGERY

## 2024-11-13 PROCEDURE — 99024 POSTOP FOLLOW-UP VISIT: CPT | Performed by: PLASTIC SURGERY

## 2024-11-13 PROCEDURE — 1159F MED LIST DOCD IN RCRD: CPT | Performed by: PLASTIC SURGERY

## 2024-11-13 PROCEDURE — 3048F LDL-C <100 MG/DL: CPT | Performed by: PLASTIC SURGERY

## 2024-11-13 ASSESSMENT — ENCOUNTER SYMPTOMS
FEVER: 0
CHILLS: 0

## 2024-11-13 ASSESSMENT — PAIN SCALES - GENERAL: PAINLEVEL_OUTOF10: 0-NO PAIN

## 2024-11-13 NOTE — PROGRESS NOTES
Subjective   Patient ID: Jason Albarran is a 69 y.o. male.    HPI  Status post right thumb trigger release  Review of Systems   Constitutional:  Negative for chills and fever.       Objective   Physical Exam  Incision is nicely approximated.  There is no signs of infection.  There is no longer any pain or triggering  Assessment/Plan   There are no diagnoses linked to this encounter.    Status post right thumb trigger release, doing well  Wound care instructions discussed with the patient  Follow-up 1 week for suture removal  
no lymphadenopathy palpated

## 2024-11-20 ENCOUNTER — APPOINTMENT (OUTPATIENT)
Dept: PLASTIC SURGERY | Facility: CLINIC | Age: 69
End: 2024-11-20
Payer: MEDICARE

## 2024-11-20 VITALS — HEIGHT: 70 IN | WEIGHT: 259 LBS | BODY MASS INDEX: 37.08 KG/M2

## 2024-11-20 DIAGNOSIS — M65.311 TRIGGER THUMB, RIGHT THUMB: Primary | ICD-10-CM

## 2024-11-20 PROCEDURE — 1160F RVW MEDS BY RX/DR IN RCRD: CPT | Performed by: PLASTIC SURGERY

## 2024-11-20 PROCEDURE — 1159F MED LIST DOCD IN RCRD: CPT | Performed by: PLASTIC SURGERY

## 2024-11-20 PROCEDURE — 99024 POSTOP FOLLOW-UP VISIT: CPT | Performed by: PLASTIC SURGERY

## 2024-11-20 PROCEDURE — 3048F LDL-C <100 MG/DL: CPT | Performed by: PLASTIC SURGERY

## 2024-11-20 PROCEDURE — 3044F HG A1C LEVEL LT 7.0%: CPT | Performed by: PLASTIC SURGERY

## 2024-11-20 PROCEDURE — 3008F BODY MASS INDEX DOCD: CPT | Performed by: PLASTIC SURGERY

## 2024-11-20 PROCEDURE — 1126F AMNT PAIN NOTED NONE PRSNT: CPT | Performed by: PLASTIC SURGERY

## 2024-11-20 RX ORDER — AMLODIPINE BESYLATE 10 MG/1
TABLET ORAL
COMMUNITY
Start: 2024-11-19

## 2024-11-20 ASSESSMENT — ENCOUNTER SYMPTOMS
CHILLS: 0
FEVER: 0

## 2024-11-20 ASSESSMENT — PAIN SCALES - GENERAL: PAINLEVEL_OUTOF10: 0-NO PAIN

## 2024-11-20 NOTE — PROGRESS NOTES
Subjective   Patient ID: Jason Albarran is a 69 y.o. male.    HPI  Status post right trigger thumb release.  Patient is here for suture removal  Review of Systems   Constitutional:  Negative for chills and fever.       Objective   Physical Exam  Examination reveals no signs of infection on the right thumb trigger release incision.  Sutures were removed.  Band-Aid dressing was applied.  Wound care instructions discussed with the patient.  Limitations in activities and duration discussed with the patient  Assessment/Plan   There are no diagnoses linked to this encounter.  Status post right trigger thumb release, doing well  Follow-up as needed

## 2025-05-16 NOTE — PROGRESS NOTES
Nutrition Initial Assessment:     Patient Jason Albarran is a 69 y.o. male being seen at [facility] who was referred by Dr. Bryn Segovia on 5/15/25 for   1. Type 2 diabetes mellitus with peripheral neuropathy          {Is this a telehealth visit (virtual and/or phone only)? If yes, select drop down and complete the mandatory information. If this is not a telehealth visit, you can skip this and it will appear as a blank space in your final note.:[id number]}    Nutrition Assessment    Problem List[1]      Nutrition History:  Food & Nutrition History:    Food Allergies:  ;  Food Intolerances:    Vitamin/mineral intake:      Herbal supplements:    Nutrition-Related Medication Use:    GI Symptoms:    Mouth Issues:  ; Teeth Issues:    Sleep Habits:      Diet Recall:  Meal 1:    Snack 1:    Meal 2:    Snack 2:    Meal 3:    Snack 3:    Food Variety:    Oral Nutrition Supplement Use:          Fluid Intake:    Alcohol Intake:    Energy Intake:    {Nutrition Specialties Section. Does the patient need info charted for TF, TPN, OB, Mindful Eating, DM)? If Yes, click this smart list. If not, then skip & will appear as blank space in final note.. (Optional):14590}    Food Preparation:  Cooking:    Grocery Shopping:    Dining Out:      Physical Activity:        Food Security/Insecurity:      Anthropometrics:                            Weight History:   Daily Weight  11/20/24 : 117 kg (259 lb)  11/13/24 : 117 kg (259 lb)  11/05/24 : 118 kg (259 lb 4.2 oz)  10/16/24 : 116 kg (255 lb)  09/20/24 : 116 kg (255 lb)  06/02/23 : 131 kg (288 lb)  05/24/23 : 131 kg (288 lb)  05/17/23 : 131 kg (288 lb)  05/03/23 : 131 kg (288 lb)  03/22/23 : 127 kg (280 lb)    Weight Change %:       Nutrition Focused Physical Exam Findings:  Subcutaneous Fat Loss:        Muscle Wasting:       Physical Findings:  Hair:    Eyes:    Nails:    Skin:    Respiratory:    Edema:        Nutrition Significant Labs:  CBC Trend:   Recent Labs     11/12/24  0721  "10/25/24  0730 06/25/24  0736   WBC 5.1 6.2 5.7   NRBC 0.0 0.0 0.0   RBC 4.46* 4.57 4.81   HGB 13.9 14.2 14.8   HCT 41.2 42.0 44.7   MCV 92 92 93   MCH 31.2 31.1 30.8   MCHC 33.7 33.8 33.1   RDW 13.6 13.6 14.0    216 236   , RFP + Serum Mag Trend:   Recent Labs     11/12/24  0738 10/25/24  0730 06/25/24  0736 10/03/23  0657 08/08/23  1510 08/08/23  1453   GLUCOSE 110* 109* 99   < >  --  109*    139 138   < >  --  138   K 4.1 4.4 4.1   < >  --  3.9    103 101   < >  --  102   CO2 28 29 28   < >  --  29   ANIONGAP 10 11 13   < >  --  11   BUN 23 26* 21   < >  --  24*   CREATININE 0.81 0.77 0.76   < >  --  0.85   EGFR >90 >90 >90   < >  --   --    CALCIUM 9.0 9.2 9.3   < >  --  9.6   ALBUMIN 4.0 4.1 4.2   < >  --  4.4   MG  --   --   --   --  CANCELED 2.11    < > = values in this interval not displayed.   , LFT Trend:   Recent Labs     11/12/24  0738 10/25/24  0730 06/25/24  0736   ALBUMIN 4.0 4.1 4.2   BILITOT 0.4 0.4 0.5   ALKPHOS 48 47 51   ALT 30 23 29   AST 20 17 19   PROT 6.7 6.7 6.9   , DM Specific Labs Trend (Includes HgbA1C, antibodies & fasting insulin):   Recent Labs     11/12/24 0738 06/25/24 0736 03/07/24  0640   HGBA1C 6.7* 6.3* 6.4*   , Lipid Panel Trend:    Recent Labs     11/12/24 0738 06/25/24  0736 03/07/24  0640 10/03/23  0657 05/05/23  0649 01/17/23  0701 09/21/22  0643   CHOL 132 149 146   < > 147 134 121   HDL 38.0 40.4 38.3   < > 34.0* 33.9* 34.0*   LDLCALC 56 76 77   < >  --   --   --    LDLF  --   --   --   --  76 64 58   VLDL 38 32 31   < > 37 36 29   TRIG 189* 162* 156*   < > 184* 181* 147    < > = values in this interval not displayed.   , Iron Panel + Serum Ferritin Trend: No results for input(s): \"IRON\", \"UIBC\", \"TIBC\", \"IRONSAT\", \"FERRITIN\" in the last 02214 hours., Vitamin B12: No results found for: \"HXWDFEOT32\" , Folate: No results found for: \"FOLATE\" , and Vitamin D: No results found for: \"VITD25\"     Medications:  Current Outpatient Medications   Medication " Instructions    albuterol 90 mcg/actuation inhaler 2 puffs, Every 4 hours PRN    amLODIPine (Norvasc) 10 mg tablet     cholecalciferol (VITAMIN D-3) 1,000 Units, Daily    coenzyme Q-10 100 mg, Daily    finasteride (PROSCAR) 5 mg, Daily RT    gabapentin (Neurontin) 300 mg capsule TAKE 1 CAPSULE TWICE DAILY AS DIRECTED    ketoconazole (NIZOral) 2 % cream 2 times a day as needed.    lancets 33 gauge misc USE 1 LANCET AS DIRECTED TWICE A DAY    lisinopriL-hydrochlorothiazide 20-12.5 mg tablet 1 tablet, 2 times daily    multivitamin (Daily Multi-Vitamin) tablet 1 tablet, Daily    naproxen (NAPROSYN) 500 mg, 2 times daily    OneTouch Verio test strips strip TEST TWICE A DAY AS DIRECTED    rosuvastatin (CRESTOR) 5 mg, Daily    tamsulosin (Flomax) 0.4 mg 24 hr capsule 1 capsule, 2 times daily        Estimated Needs:   ;     ;     ;     ;                 Nutrition Diagnosis                Nutrition Interventions/Recommendations   Nutrition Prescription:        Nutrition Interventions:   Food and Nutrient Delivery:       Coordination of Care:       Nutrition Education:   Nutrition Education Content:         Educational Handouts Provided: {JenEducationHandouts:66134}    Nutrition Counseling:        {Readiness to Change (Optional):19585}  {Level of Understanding (Optional):50720}  {Anticipated Compliant (Optional):00495}         Nutrition Monitoring and Evaluation                                 Follow Up:             [1]   Patient Active Problem List  Diagnosis    Actinic keratoses    Arthritis    Hydrarthrosis    Basal cell carcinoma of left ear    Benign essential hypertension    Bladder dysfunction    Benign prostatic hyperplasia    BPH with obstruction/lower urinary tract symptoms    COPD (chronic obstructive pulmonary disease) (Multi)    Degeneration of lumbar or lumbosacral intervertebral disc    Depression    Dizziness    Dyslipidemia    Epidermoid cyst    Extremity atherosclerosis with intermittent claudication     Fatigue    Frequency-urgency syndrome    Hemangioma    History of depression    History of basal cell carcinoma    History of squamous cell carcinoma    History of TIAs    History of total knee replacement    S/P left unicompartmental knee replacement    HTN (hypertension)    Hyperlipidemia    Intermittent hydrarthrosis of left knee    Localized osteoarthrosis, lower leg    Osteoarthritis of knee    Patellofemoral arthritis of left knee    Right medial tibial plateau fracture    Lower back pain    Lumbar stenosis with neurogenic claudication    Lumbar strain    Mediastinal shift    BLANQUITA (obstructive sleep apnea)    Pain of left lower extremity    Pain of right lower extremity    Pelvic pain    Peripheral nerve disease    Periprosthetic fracture around internal prosthetic right knee joint    Post-traumatic osteoarthritis of right knee    Primary osteoarthritis of both knees    Reactive airway disease (HHS-HCC)    Right-sided low back pain with right-sided sciatica    Seborrheic keratosis    Shortness of breath    Trochanteric bursitis of right hip    Diabetes mellitus (Multi)    Type 2 diabetes mellitus with peripheral neuropathy    Urinary hesitancy    Vitamin D deficiency    Trigger thumb of right hand      intervertebral disc    Depression    Dizziness    Dyslipidemia    Epidermoid cyst    Extremity atherosclerosis with intermittent claudication    Fatigue    Frequency-urgency syndrome    Hemangioma    History of depression    History of basal cell carcinoma    History of squamous cell carcinoma    History of TIAs    History of total knee replacement    S/P left unicompartmental knee replacement    HTN (hypertension)    Hyperlipidemia    Intermittent hydrarthrosis of left knee    Localized osteoarthrosis, lower leg    Osteoarthritis of knee    Patellofemoral arthritis of left knee    Right medial tibial plateau fracture    Lower back pain    Lumbar stenosis with neurogenic claudication    Lumbar strain    Mediastinal shift    BLANQUITA (obstructive sleep apnea)    Pain of left lower extremity    Pain of right lower extremity    Pelvic pain    Peripheral nerve disease    Periprosthetic fracture around internal prosthetic right knee joint    Post-traumatic osteoarthritis of right knee    Primary osteoarthritis of both knees    Reactive airway disease (HHS-HCC)    Right-sided low back pain with right-sided sciatica    Seborrheic keratosis    Shortness of breath    Trochanteric bursitis of right hip    Diabetes mellitus (Multi)    Type 2 diabetes mellitus with peripheral neuropathy    Urinary hesitancy    Vitamin D deficiency    Trigger thumb of right hand

## 2025-05-19 ENCOUNTER — APPOINTMENT (OUTPATIENT)
Dept: NUTRITION | Facility: CLINIC | Age: 70
End: 2025-05-19
Payer: MEDICARE

## 2025-05-19 DIAGNOSIS — E11.42 TYPE 2 DIABETES MELLITUS WITH DIABETIC POLYNEUROPATHY: ICD-10-CM

## 2025-05-19 DIAGNOSIS — E11.42 TYPE 2 DIABETES MELLITUS WITH DIABETIC POLYNEUROPATHY, WITHOUT LONG-TERM CURRENT USE OF INSULIN: ICD-10-CM

## 2025-05-19 DIAGNOSIS — E11.42 TYPE 2 DIABETES MELLITUS WITH PERIPHERAL NEUROPATHY: Primary | ICD-10-CM

## 2025-05-19 PROCEDURE — 97802 MEDICAL NUTRITION INDIV IN: CPT | Performed by: DIETITIAN, REGISTERED

## 2025-05-20 NOTE — PATIENT INSTRUCTIONS
Utilize the Plate Method at meals in order to balance the types and amounts of food on the plate.   - half of the plate full of non-starchy vegetables. These foods contribute very little carbohydrate to the plate, and they add fiber to the meal. Salad, carrots, bell peppers, zucchini, broccoli, cauliflower, asparagus, radishes, carrots and green beans are a few examples of these foods. They can be served cooked or raw.    - one quarter of plate including protein foods. Examples can include meat, nuts, seeds, cheese, cottage cheese, nut butter, fish, seafood, tofu, and edamame.    - one quarter of the plate including carbohydrate foods. These foods include grains, fruit, legumes, starchy vegetables, milk and yogurt. Aim to eat at least half of the daily grains as whole grains.    2. Introduced patient to carbohydrate counting. Explained the following:   - foods that contribute carbohydrate (fruit, grains, legumes, milk/yogurt, starchy vegetables, sugar added to foods, sugar-sweetened beverages)  - foods that contribute no/minimal carbohydrate (protein, fats, non-starchy vegetables)  - how to use portions of food that are 15 grams of carbohydrate to facilitate counting, gave examples of such portions  - how to read a food label to count carbohydrate  - advised patient to consume 30-60  grams of carbohydrate per meal (30-45 breakfast, 45 at lunch - or maximum 60 at lunch, and 45-60 at dinner) and 15-20 grams of carbohydrate per snack    3. Advised checking blood glucose 2 hours after a meal can help reveal how his blood sugar is impacted by dietary choices at different meals of the day. The target is to have blood sugar under 180 mg/dL at 2 hours post-meal.

## 2025-05-21 ENCOUNTER — NUTRITION (OUTPATIENT)
Dept: NUTRITION | Facility: CLINIC | Age: 70
End: 2025-05-21
Payer: MEDICARE

## 2025-05-21 DIAGNOSIS — E11.42 TYPE 2 DIABETES MELLITUS WITH DIABETIC POLYNEUROPATHY, UNSPECIFIED WHETHER LONG TERM INSULIN USE: ICD-10-CM

## 2025-05-21 DIAGNOSIS — E11.42 TYPE 2 DIABETES MELLITUS WITH DIABETIC POLYNEUROPATHY: ICD-10-CM

## 2025-05-21 PROCEDURE — G0108 DIAB MANAGE TRN  PER INDIV: HCPCS | Performed by: EMERGENCY MEDICINE

## 2025-05-21 NOTE — PROGRESS NOTES
Reason for Visit:  Jason Albarran is a 69 y.o. male who presents for Initial DSME Visit    DSME - Global Assessment    Marital Status: .  Support Person: wife.    What do you hope to gain from this diabetes education visit? Get enough education so I won't need medication. I've taken 4 meds and didn't feel good with it.     Have you had diabetes education in the past?  Yes. When: RD 7-8 yr ago and RD recently .  In Your words, what is Diabetes: pancreas not making enough insulin or not enough insulin being made  What Concerns you most about having diabetes: losing leg like grandmother    Readiness to Learn: demonstrates willingness to learn and demonstrates ability to learn  Preferred learning method: observing and listening    How often do you need to have someone help you when you read instructions, pamphlets or other written material from your doctor or pharmacy?   Never    Household Composition: living independently, with family    Demographics:   Difficulties with: None  Highest Level of Education: Some College  Race/Ethnic Origin: White/  Occupation:  retired  Work hours:     Health Status:  Smoking/Tobacco Use: No, patient does not smoke or use tobacco.  Alcohol Use: No, patient does not drink alcohol.    Type of Diabetes: Type 2  What year were you diagnosed with diabetes: 7-8 yr ago  Family History: grandmother, dad, aunt    Patient Active Problem List    Diagnosis Date Noted    Arthritis 09/09/2024    Bladder dysfunction 09/09/2024    Benign prostatic hyperplasia 09/09/2024    COPD (chronic obstructive pulmonary disease) (Multi) 09/09/2024    Depression 09/09/2024    Dizziness 09/09/2024    Extremity atherosclerosis with intermittent claudication 09/09/2024    History of depression 09/09/2024    History of TIAs 09/09/2024    Hyperlipidemia 09/09/2024    Lower back pain 09/09/2024    Lumbar strain 09/09/2024    Peripheral nerve disease 09/09/2024    Diabetes mellitus (Multi) 09/09/2024     Vitamin D deficiency 09/09/2024    Pelvic pain 01/25/2024    Type 2 diabetes mellitus with peripheral neuropathy 01/25/2024    Urinary hesitancy 02/14/2022    S/P left unicompartmental knee replacement 11/08/2021    Trochanteric bursitis of right hip 11/08/2021    Reactive airway disease (HHS-HCC) 09/12/2021    Fatigue 04/27/2021    Shortness of breath 04/27/2021    BPH with obstruction/lower urinary tract symptoms 11/24/2020    Frequency-urgency syndrome 11/24/2020    Mediastinal shift 11/02/2018    Benign essential hypertension 08/25/2018    Dyslipidemia 08/25/2018    HTN (hypertension) 08/25/2018    Pain of left lower extremity 08/16/2018    Pain of right lower extremity 08/16/2018    Seborrheic keratosis 10/26/2017    BLANQUITA (obstructive sleep apnea) 06/15/2017    History of squamous cell carcinoma 04/07/2016    Lumbar stenosis with neurogenic claudication 01/28/2016    History of total knee replacement 12/01/2015    Intermittent hydrarthrosis of left knee 10/26/2015    Right medial tibial plateau fracture 10/19/2015    Periprosthetic fracture around internal prosthetic right knee joint 10/19/2015    Post-traumatic osteoarthritis of right knee 10/07/2015    Primary osteoarthritis of both knees 08/20/2015    Degeneration of lumbar or lumbosacral intervertebral disc 08/07/2015    Right-sided low back pain with right-sided sciatica 07/28/2015    Patellofemoral arthritis of left knee 06/24/2015    Actinic keratoses 04/07/2015    Epidermoid cyst 04/07/2015    Hemangioma 04/07/2015    History of basal cell carcinoma 04/07/2015    Basal cell carcinoma of left ear 12/03/2014    Hydrarthrosis 04/22/2014    Osteoarthritis of knee 08/03/2011    Localized osteoarthrosis, lower leg 03/10/2009    Trigger thumb of right hand 09/20/2024      Lab Results   Component Value Date    HGBA1C 6.7 (H) 11/12/2024    HGBA1C 6.3 (H) 06/25/2024    HGBA1C 6.4 (H) 03/07/2024    HGBA1C 6.0 11/19/2021    HGBA1C 5.7 08/31/2019       Health  Utilization Past 12 Months:   Hospital Admissions: No.  ER Visits: No.  Primary Care Visits: Yes.  Last Eye Exam : patient reports annual screening by optometry/opthalmology  Podiatry : toenail fungus, toenail trimmed  Dentist : Last Visit: twice a year    Diabetes Self-Management Skills and Behaviors:   Do you exercise regularly?: No. Has to stop after 15 minutes due to back or if in large store he uses cart due to knee  I mow the grass.     No. Average amount of hours slept per night: 6-7 hr  How do you manage your diabetes when you are sick?: unsure    Diabetes Medications: none  Current Outpatient Medications   Medication Instructions    albuterol 90 mcg/actuation inhaler 2 puffs, Every 4 hours PRN    amLODIPine (Norvasc) 10 mg tablet     cholecalciferol (VITAMIN D-3) 1,000 Units, Daily    coenzyme Q-10 100 mg, Daily    finasteride (PROSCAR) 5 mg, Daily RT    gabapentin (Neurontin) 300 mg capsule TAKE 1 CAPSULE TWICE DAILY AS DIRECTED    ketoconazole (NIZOral) 2 % cream 2 times a day as needed.    lancets 33 gauge misc USE 1 LANCET AS DIRECTED TWICE A DAY    lisinopriL-hydrochlorothiazide 20-12.5 mg tablet 1 tablet, 2 times daily    multivitamin (Daily Multi-Vitamin) tablet 1 tablet, Daily    naproxen (NAPROSYN) 500 mg, 2 times daily    OneTouch Verio test strips strip TEST TWICE A DAY AS DIRECTED    rosuvastatin (CRESTOR) 5 mg, Daily    tamsulosin (Flomax) 0.4 mg 24 hr capsule 1 capsule, 2 times daily          DM medications as patient is taking them:      Monitorng: blood glucose meter: once daily fasting or if not feeling well    Acute Complications-Safety: sometimes carries glucose tablets    Hypoglycemia: When he was on DM medication on past his levels went down. About 6 days ago he was on DM meds and felt bad. Blood sugar dropped to 79. His doctor stopped the medication.   Not currently on any DM meds.  In past with Glimepride: severe drop and multiple times a day.   He's had other issues with 3 other meds  (not due to low)    Hypoglycemia Treatment: glucose tablets 3-4      Hyperglycemia: None  Hyperglycemia Treatment: denies      Diabetes Assessment:   DM Interferes with other aspects of my life: neutral. To watch what I eat.  My level of stress is high: neutral regarding DM  I struggle with making changes in my life: neutral.  How do you handle stress: music  Most difficult part of managing DM: physical activity    DSME - Meal Planning and Diet Recall    He met with RD recently so diet qeustions deferred.     DSME - Goals and Recommendations    [facility] Diabetes Education Program SMART Behavior Goal Setting:        S - Specific: Exactly what do you want to do        M - Measurable: Use a calendar or chart to track progress        A - Attainable: Take small steps to make bigger changes        R - Realistic: Pick something reasonable that you know you can do        T - Time Oriented: Choose a time limit (No longer than 6 months)    Specific Goal:   I will buy resistance bands.     2. I will do resistance exercises for 15 minutes at least 3 days per week.     Measurable: How will I track my goal:  I will keep track of my progress daily by other.    Time Oriented: four weeks.    Topics Covered and Impression:    DSME Topics Covered During Visit:   A1c Review  Understanding Diabetes Basics  Being Physically Active  Using a Blood Sugar Monitor  Using BG Logbook  Review Glycemic Goals (CGM or SMBG)  Reviewed Hypoglycemia Signs/Symptoms/Tx Plan  Reviewed Hyperglycemia Signs/Symptoms/Tx Plan  Healthy Meal Plan  Dehydration    Verbalized understanding of above topics. Any listed below require review.     DSME Topics for Follow-Up:   Reviewed Chronic Complications/Risks Related to Diabetes  Being Physically Active  Using BG Logbook  Sick Day Rules    Materials provided during today's visit:  DM patient guide, 2 handouts on exercise, blood glucose log sheet, A1C handout with patient's level written along with  target level, hypo/hyperglycemia signs/symptoms & treatment for hypoglycemia. [employer] healthy meal planning booklet. Typed up goals.     Provider Impression: He's concerned A1C has been creeping up. He's not been able to tolerate 4 different DM meds. He is concerned family history of people with DM complications and dying. Just   Since 2018 he was diagnosed pre-DM and he was trying to manage things since. Did have 80 pound weight gain after job loss after injury. Has had 2 knee replacements and has knee and back pain.    Admits he I doesn't feel good physically if blood sugar is below 100. He's unsure why some mornings readings are higher than others especially if he's eating well. Discussed liver's role in DM and sanford phenomenon.   Fasting levels 110-130's and once 174 and he could not figure out why it was that high.   His challenge is he can't do a lot of exercise due to knee and back pain. He can do sitting exercises and if walking he do shorter intervals of 15 min.   Role and benefit of regular physical activity in terms of blood sugar, weight management and overall health discussed. Encouraged to do short intervals of exercise if needed based on how his back and knee feel. Reviewed accessible and low cost options that don't require gym such like using resistance band exercises & demonstrated some he can do even while sitting. He and wife liked this option. Provided examples of how to set small goals and gradually build up.    Met with RD recently, but he and wife had a lot of diet questions still.   Plans to see Endocrinologist next week.   The referring provider is within the same EMR and is able to see the DSMES provided and the outcomes.       Time: I personally spent a total of 60 minutes with the patient providing diabetes self-management education. Visit documentation will be sent electronically to referring provider.

## 2025-06-25 ENCOUNTER — APPOINTMENT (OUTPATIENT)
Dept: NUTRITION | Facility: CLINIC | Age: 70
End: 2025-06-25
Payer: MEDICARE

## 2025-08-06 ENCOUNTER — APPOINTMENT (OUTPATIENT)
Dept: RADIOLOGY | Facility: HOSPITAL | Age: 70
End: 2025-08-06
Payer: MEDICARE

## 2025-08-06 DIAGNOSIS — M76.891 OTHER SPECIFIED ENTHESOPATHIES OF RIGHT LOWER LIMB, EXCLUDING FOOT: ICD-10-CM

## 2025-08-06 DIAGNOSIS — N13.8 OTHER OBSTRUCTIVE AND REFLUX UROPATHY: ICD-10-CM

## 2025-08-06 DIAGNOSIS — N31.8 OTHER NEUROMUSCULAR DYSFUNCTION OF BLADDER: ICD-10-CM

## 2025-08-06 DIAGNOSIS — N41.1 CHRONIC PROSTATITIS: ICD-10-CM

## 2025-08-06 DIAGNOSIS — E11.42 TYPE 2 DIABETES MELLITUS WITH DIABETIC POLYNEUROPATHY: ICD-10-CM

## 2025-08-06 DIAGNOSIS — N40.1 BENIGN PROSTATIC HYPERPLASIA WITH LOWER URINARY TRACT SYMPTOMS: ICD-10-CM

## 2025-08-06 PROCEDURE — 76770 US EXAM ABDO BACK WALL COMP: CPT | Performed by: STUDENT IN AN ORGANIZED HEALTH CARE EDUCATION/TRAINING PROGRAM

## 2025-08-06 PROCEDURE — 76770 US EXAM ABDO BACK WALL COMP: CPT

## 2025-08-08 ENCOUNTER — HOSPITAL ENCOUNTER (OUTPATIENT)
Dept: RADIOLOGY | Facility: HOSPITAL | Age: 70
Discharge: HOME | End: 2025-08-08
Payer: MEDICARE

## 2025-08-08 DIAGNOSIS — E11.42 TYPE 2 DIABETES MELLITUS WITH DIABETIC POLYNEUROPATHY: ICD-10-CM

## 2025-08-08 DIAGNOSIS — N13.8 OTHER OBSTRUCTIVE AND REFLUX UROPATHY: ICD-10-CM

## 2025-08-08 DIAGNOSIS — N40.1 BENIGN PROSTATIC HYPERPLASIA WITH LOWER URINARY TRACT SYMPTOMS: ICD-10-CM

## 2025-08-08 DIAGNOSIS — M76.891 OTHER SPECIFIED ENTHESOPATHIES OF RIGHT LOWER LIMB, EXCLUDING FOOT: ICD-10-CM

## 2025-08-08 DIAGNOSIS — N31.8 OTHER NEUROMUSCULAR DYSFUNCTION OF BLADDER: ICD-10-CM

## 2025-08-08 DIAGNOSIS — N41.1 CHRONIC PROSTATITIS: ICD-10-CM

## 2025-08-08 PROCEDURE — 76872 US TRANSRECTAL: CPT

## (undated) DEVICE — NEEDLE, SAFETY, 18 G X 1.5 IN

## (undated) DEVICE — Device

## (undated) DEVICE — TUBE ENDOSCP COLON CHANNEL

## (undated) DEVICE — Device: Brand: ENDO SMARTCAP

## (undated) DEVICE — GOWN, SURGICAL, ROYAL SILK, LG, STERILE

## (undated) DEVICE — STRAP, VELCRO, BODY, 4 X 60IN, NS

## (undated) DEVICE — SYRINGE, 10 CC, LUER LOCK

## (undated) DEVICE — 4-PORT MANIFOLD: Brand: NEPTUNE 2

## (undated) DEVICE — SYRINGE, 60 CC, IRRIGATION, BULB, CONTRO-BULB, PAPER POUCH

## (undated) DEVICE — TRAY, DRY PREP, PREMIUM

## (undated) DEVICE — DRAPE, SHEET, 17 X 23 IN

## (undated) DEVICE — TRAYS TRANSPORT SCOPE OASIS W/LID

## (undated) DEVICE — MEDI-VAC NON-CONDUCTIVE SUCTION TUBING: Brand: CARDINAL HEALTH

## (undated) DEVICE — BANDAGE, ESMARK 4 IN X 9 FT, STERILE

## (undated) DEVICE — FORCEPS BX L240CM JAW DIA2.4MM ORNG L CAP W/ NDL DISP RAD

## (undated) DEVICE — GLOVE, SURGICAL, PROTEXIS PI , 7.5, PF, LF

## (undated) DEVICE — ENDO CARRY-ON PROCEDURE KIT INCLUDES LUBRICANT, DEFENDO OLYMPUS AIR, WATER, SUCTION, BIOPSY VALVE KIT, ENZYMATIC SPONGE, AND BASIN.: Brand: ENDO CARRY-ON PROCEDURE KIT

## (undated) DEVICE — STRAP, ARM BOARD, 32 X 1.5

## (undated) DEVICE — SLING, ARM, W/LEATHERETTE PAD, MEDIUM

## (undated) DEVICE — CUFF, TOURNIQUET, DUAL PORT, SNG BLADDER, 18 IN, PLC

## (undated) DEVICE — BW-412T DISP COMBO CLEANING BRUSH: Brand: SINGLE USE COMBINATION CLEANING BRUSH

## (undated) DEVICE — ADAPTER FLSH PMP FLD MGMT GI IRRIG OFP 2 DISPOSABLE

## (undated) DEVICE — BOWL, BASIN, 32 OZ, STERILE

## (undated) DEVICE — NEEDLE, SAFETY, 25 GA X 1.5 IN